# Patient Record
Sex: FEMALE | Race: OTHER | HISPANIC OR LATINO | URBAN - METROPOLITAN AREA
[De-identification: names, ages, dates, MRNs, and addresses within clinical notes are randomized per-mention and may not be internally consistent; named-entity substitution may affect disease eponyms.]

---

## 2020-11-20 ENCOUNTER — EMERGENCY (EMERGENCY)
Facility: HOSPITAL | Age: 25
LOS: 1 days | Discharge: HOME | End: 2020-11-20
Attending: EMERGENCY MEDICINE | Admitting: EMERGENCY MEDICINE
Payer: SELF-PAY

## 2020-11-20 VITALS
TEMPERATURE: 98 F | HEART RATE: 84 BPM | HEIGHT: 63 IN | OXYGEN SATURATION: 99 % | RESPIRATION RATE: 18 BRPM | SYSTOLIC BLOOD PRESSURE: 126 MMHG | DIASTOLIC BLOOD PRESSURE: 75 MMHG | WEIGHT: 164.91 LBS

## 2020-11-20 VITALS
TEMPERATURE: 98 F | DIASTOLIC BLOOD PRESSURE: 72 MMHG | HEART RATE: 78 BPM | RESPIRATION RATE: 16 BRPM | OXYGEN SATURATION: 98 % | SYSTOLIC BLOOD PRESSURE: 120 MMHG

## 2020-11-20 DIAGNOSIS — M54.9 DORSALGIA, UNSPECIFIED: ICD-10-CM

## 2020-11-20 PROCEDURE — 73562 X-RAY EXAM OF KNEE 3: CPT

## 2020-11-20 PROCEDURE — 99283 EMERGENCY DEPT VISIT LOW MDM: CPT

## 2020-11-20 PROCEDURE — 99053 MED SERV 10PM-8AM 24 HR FAC: CPT

## 2020-11-20 PROCEDURE — 81025 URINE PREGNANCY TEST: CPT

## 2020-11-20 RX ORDER — METHOCARBAMOL 500 MG/1
500 TABLET, FILM COATED ORAL ONCE
Refills: 0 | Status: COMPLETED | OUTPATIENT
Start: 2020-11-20 | End: 2020-11-20

## 2020-11-20 RX ORDER — OXYCODONE AND ACETAMINOPHEN 5; 325 MG/1; MG/1
1 TABLET ORAL ONCE
Refills: 0 | Status: DISCONTINUED | OUTPATIENT
Start: 2020-11-20 | End: 2020-11-20

## 2020-11-20 RX ADMIN — OXYCODONE AND ACETAMINOPHEN 1 TABLET(S): 5; 325 TABLET ORAL at 23:53

## 2020-11-20 RX ADMIN — METHOCARBAMOL 500 MILLIGRAM(S): 500 TABLET, FILM COATED ORAL at 23:53
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
## 2020-11-20 NOTE — ED ADULT NURSE NOTE - CHPI ED NUR SYMPTOMS NEG
no acting out behaviors/no bruising/no crying/no decreased eating/drinking/no difficulty bearing weight/no disorientation/no dizziness/no fussiness/no laceration/no loss of consciousness/no sleeping issues

## 2020-11-20 NOTE — ED ADULT NURSE NOTE - NSIMPLEMENTINTERV_GEN_ALL_ED
Implemented All Universal Safety Interventions:  [place] to call system. Call bell, personal items and telephone within reach. Instruct patient to call for assistance. Room bathroom lighting operational. Non-slip footwear when patient is off stretcher. Physically safe environment: no spills, clutter or unnecessary equipment. Stretcher in lowest position, wheels locked, appropriate side rails in place.

## 2020-11-21 PROCEDURE — 73562 X-RAY EXAM OF KNEE 3: CPT | Mod: 26,LT

## 2020-11-21 RX ORDER — OXYCODONE AND ACETAMINOPHEN 5; 325 MG/1; MG/1
1 TABLET ORAL
Qty: 12 | Refills: 0
Start: 2020-11-21 | End: 2020-11-23

## 2020-11-21 RX ORDER — METHOCARBAMOL 500 MG/1
1 TABLET, FILM COATED ORAL
Qty: 15 | Refills: 0
Start: 2020-11-21 | End: 2020-11-25

## 2020-11-21 RX ADMIN — OXYCODONE AND ACETAMINOPHEN 1 TABLET(S): 5; 325 TABLET ORAL at 00:26
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
## 2020-11-21 NOTE — ED PROVIDER NOTE - PATIENT PORTAL LINK FT
You can access the FollowMyHealth Patient Portal offered by [facility] by registering at the following website: [email or web address]. By joining [facility] FollowMyHealth portal, you will also be able to view your health information using other applications (apps) compatible with our system.
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
## 2023-03-21 ENCOUNTER — APPOINTMENT (OUTPATIENT)
Dept: OBGYN | Facility: CLINIC | Age: 28
End: 2023-03-21
Payer: COMMERCIAL

## 2023-03-21 VITALS
SYSTOLIC BLOOD PRESSURE: 133 MMHG | HEIGHT: 62 IN | DIASTOLIC BLOOD PRESSURE: 79 MMHG | BODY MASS INDEX: 36.07 KG/M2 | WEIGHT: 196 LBS

## 2023-03-21 DIAGNOSIS — Z30.430 ENCOUNTER FOR INSERTION OF INTRAUTERINE CONTRACEPTIVE DEVICE: ICD-10-CM

## 2023-03-21 DIAGNOSIS — Z78.9 OTHER SPECIFIED HEALTH STATUS: ICD-10-CM

## 2023-03-21 PROCEDURE — 58300 INSERT INTRAUTERINE DEVICE: CPT

## 2023-03-21 PROCEDURE — 64435 NJX AA&/STRD PARACRV NRV: CPT

## 2023-03-21 PROCEDURE — 76998 US GUIDE INTRAOP: CPT

## 2023-03-21 PROCEDURE — 99203 OFFICE O/P NEW LOW 30 MIN: CPT | Mod: 25

## 2023-03-21 NOTE — PLAN
[FreeTextEntry1] : Patient is a 27 year old, G0, seen for IUD insertion.\par - HCG negative\par - GC/CT obtained\par - 600mg Motrin PO provided to patient\par - IUD inserted under transabdominal ultrasound guidance to fundus\par - fundal placement confirmed via TVUS\par - All questions/concerns addressed to patient's satisfaction\par - Patient to follow up as needed and for desired GYN care\par

## 2023-03-21 NOTE — HISTORY OF PRESENT ILLNESS
[FreeTextEntry1] : Patient is a 27 year old, G0,  presenting for contraceptive consultation.\par \par Patient does not have a primary GYN; was referred by Sarai Coelho (primary care)\par Last pap: 2/4/23 (WNL) done by PCP.\par \par GYNHx:\par Denies abnl pap smears\par Denies fibroids/endometriosis/cysts\par Denies STIs\par \par LMP: 3/13/23, irregular cycles\par Contraception: DMPA - was not happy with method of contraception because of cycle irregularities and headaches and stopped 2 months ago, has not used contraception since except for condoms\par \par SexualHx: 1 week ago, with condoms\par \par 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

## 2023-03-21 NOTE — REASON FOR VISIT
[Consultation] : consultation for [[employer] ] : provided by [employer]   [Time Spent: ____ minutes] : Total time spent using  services: [unfilled] minutes. The patient's primary language is not English thus required  services. [Interpreters_IDNumber] : [id number] [Interpreters_FullName] : Duran [TWNoteComboBox1] : Russian

## 2023-03-21 NOTE — PHYSICAL EXAM
[Appropriately responsive] : appropriately responsive [Alert] : alert [No Acute Distress] : no acute distress [Oriented x3] : oriented x3 [Labia Majora] : normal [Labia Minora] : normal [Normal] : normal [Retroversion] : retroverted

## 2023-03-23 LAB
C TRACH RRNA SPEC QL NAA+PROBE: NOT DETECTED
SOURCE AMPLIFICATION: NORMAL

## 2023-05-02 ENCOUNTER — APPOINTMENT (OUTPATIENT)
Dept: OBGYN | Facility: CLINIC | Age: 28
End: 2023-05-02
Payer: MEDICAID

## 2023-05-02 ENCOUNTER — OUTPATIENT (OUTPATIENT)
Dept: OUTPATIENT SERVICES | Facility: HOSPITAL | Age: 28
LOS: 1 days | End: 2023-05-02
Payer: MEDICAID

## 2023-05-02 VITALS
BODY MASS INDEX: 36.63 KG/M2 | SYSTOLIC BLOOD PRESSURE: 130 MMHG | WEIGHT: 200.25 LBS | DIASTOLIC BLOOD PRESSURE: 80 MMHG

## 2023-05-02 DIAGNOSIS — Z30.09 ENCOUNTER FOR OTHER GENERAL COUNSELING AND ADVICE ON CONTRACEPTION: ICD-10-CM

## 2023-05-02 DIAGNOSIS — N76.0 ACUTE VAGINITIS: ICD-10-CM

## 2023-05-02 PROCEDURE — 99213 OFFICE O/P EST LOW 20 MIN: CPT | Mod: GE

## 2023-05-04 NOTE — PLAN
[FreeTextEntry1] : 29yo G0 with Mirena placed on 3/21 presenting for IUD string check. Patient feels well today, has no issues with the IUD, and IUD string seen on pelvic exam.\par \par #Mirena IUD\par - Strings visualized on exam, IUD in place\par - Counseled patient that abnormal spotting may continue for a few months\par \par #Healthcare maintenance\par - pap NILM (2/2023)\par \par RTC for annual or sooner PRN\par D/w Dr. Colon\par Alee Fagan, PGY-1\par \par \par

## 2023-05-04 NOTE — PHYSICAL EXAM
[Appropriately responsive] : appropriately responsive [Alert] : alert [No Acute Distress] : no acute distress [Soft] : soft [Non-tender] : non-tender [Non-distended] : non-distended [No Lesions] : no lesions [No Mass] : no mass [Oriented x3] : oriented x3 [Labia Majora] : normal [Labia Minora] : normal [Scant] : There was scant vaginal bleeding [Old Blood] : old blood was present in the vagina [IUD String] : an IUD string was noted [Normal] : normal [Uterine Adnexae] : normal [FreeTextEntry4] : well perfused [FreeTextEntry5] : unlabored respirations

## 2023-05-04 NOTE — HISTORY OF PRESENT ILLNESS
[FreeTextEntry1] : 27yo G0 with Mirena placed on 3/21 presenting for IUD string check. Patient feels well today, reports occasional cramping that is relieved with Tylenol and Motrin. Reports some spotting. Denies pelvic pain, fevers/chills, nausea/vomiting, abdominal pain, dysuria, urinary urgency/frequency, changes in bowel habits.

## 2023-05-16 DIAGNOSIS — Z30.09 ENCOUNTER FOR OTHER GENERAL COUNSELING AND ADVICE ON CONTRACEPTION: ICD-10-CM

## 2023-05-18 ENCOUNTER — APPOINTMENT (OUTPATIENT)
Dept: ORTHOPEDIC SURGERY | Facility: CLINIC | Age: 28
End: 2023-05-18
Payer: MEDICAID

## 2023-05-18 DIAGNOSIS — Z00.00 ENCOUNTER FOR GENERAL ADULT MEDICAL EXAMINATION W/OUT ABNORMAL FINDINGS: ICD-10-CM

## 2023-05-18 DIAGNOSIS — M25.561 PAIN IN RIGHT KNEE: ICD-10-CM

## 2023-05-18 PROCEDURE — 73564 X-RAY EXAM KNEE 4 OR MORE: CPT | Mod: LT

## 2023-05-18 PROCEDURE — 99204 OFFICE O/P NEW MOD 45 MIN: CPT

## 2023-05-25 PROBLEM — Z00.00 ENCOUNTER FOR PREVENTIVE HEALTH EXAMINATION: Status: ACTIVE | Noted: 2023-03-13

## 2023-05-25 NOTE — HISTORY OF PRESENT ILLNESS
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

## 2023-05-25 NOTE — DISCUSSION/SUMMARY
[de-identified] : We discussed their diagnosis and treatment options at length. We will first attempt conservative treatment with a course of PT. The patient was provided with a prescription to work on hip ER/abductors strengthening along with quad/hamstring stretches and strengthening on the PF Syndrome Protocol. Ice, NSAIDs. Prescribed diclofenac. \par \par Follow up in 4-6 weeks to re-evaluate. 	\par \par The patient was advised of the diagnosis.  The natural history of the pathology was explained in full to the patient in layman's terms.  Several different treatment options were discussed and explained in full to the patient including the risks and benefits of both surgical and non-surgical treatments.  All questions and concerns were answered.   \par \par Physical Therapy: The patient was provided with a prescription for Physical Therapy.  \par \par Icing: The patient was advised to apply ice (wrapped in a towel or protective covering) to the area daily (20 minutes at a time, 2-4X/day).   \par \par Pain Guide Activities: The patient was advised to let pain guide the gradual advancement of activities.	\par

## 2023-06-22 ENCOUNTER — APPOINTMENT (OUTPATIENT)
Dept: ORTHOPEDIC SURGERY | Facility: CLINIC | Age: 28
End: 2023-06-22

## 2023-07-05 ENCOUNTER — RX RENEWAL (OUTPATIENT)
Age: 28
End: 2023-07-05

## 2023-08-14 ENCOUNTER — RX RENEWAL (OUTPATIENT)
Age: 28
End: 2023-08-14

## 2023-08-24 ENCOUNTER — APPOINTMENT (OUTPATIENT)
Dept: ORTHOPEDIC SURGERY | Facility: CLINIC | Age: 28
End: 2023-08-24
Payer: MEDICAID

## 2023-08-24 DIAGNOSIS — M22.2X2 PATELLOFEMORAL DISORDERS, LEFT KNEE: ICD-10-CM

## 2023-08-24 PROCEDURE — 99213 OFFICE O/P EST LOW 20 MIN: CPT

## 2023-08-24 NOTE — HISTORY OF PRESENT ILLNESS
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 She has completed 10 sessions of PT.

## 2023-08-24 NOTE — IMAGING
[de-identified] : The patient is a well appearing 28 year year old female of their stated age.\par  Patient ambulates with a normal gait.\par  Negative straight leg raise bilateral\par  \par  Effected Knee:                         	\par  ROM:  0-145 degrees\par  \par  Lachman: Negative\par  Pivot Shift: Negative\par  Anterior Drawer: Negative\par  Posterior Drawer / Sag: Negative\par  Varus Stress 0 degrees: Stable\par  Varus Stress 30 degrees: Stable\par  Valgus Stress 0 degrees: Stable\par  Valgus Stress 30 degrees: Stable\par  Medial Yoseph: Negative\par  Lateral Yoseph: Negative\par  Patella Glide: 2+\par  Patella Apprehension: Negative\par  Patella Grind: Negative\par  \par  Palpation:\par  Medial Joint Line: Nontender\par  Lateral Joint Line: Nontender\par  Medial Collateral Ligament: Nontender\par  Lateral Collateral Ligament/PLC: Nontender\par  Medial patellar facet: TTP\par  Distal Femur: Nontender\par  Proximal Tibia: Nontender\par  Tibial Tubercle: Nontender\par  Distal Pole Patella: Nontender\par  Quadriceps Tendon: Nontender &  Intact\par  Patella Tendon: Nontender &  Intact\par  Medial Distal Hamstring/PES: Nontender\par  Lateral Distal Hamstring: Nontender & Stable\par  Iliotibial Band: Nontender\par  Medial Patellofemoral Ligament: Nontender\par  Adductor: Nontender\par  Proximal GSC-Plantaris: Nontender\par  Calf: Supple & Nontender\par  \par  Inspection:\par  Deformity: No\par  Erythema: No\par  Ecchymosis: No\par  Abrasions: No\par  Effusion: No\par  Prepatellar Bursitis: No\par  \par  Neurologic Exam:\par  Sensation L4-S1: Grossly Intact\par  \par  Motor Exam:\par  Hip Adductors: 4+/5\par  Quadriceps: 5 out of 5\par  Hamstrings: 5 out of 5\par  EHL: 5 out of 5\par  FHL: 5 out of 5\par  TA: 5 out of 5\par  GS: 5 out of 5\par  \par  Circulatory/Pulses:\par  Dorsalis Pedis: 2+\par  Posterior Tibialis: 2+\par  \par  Additional Pertinent Findings: None\par  \par  Contralateral Knee:                           	\par  ROM: 0-145 degrees\par  \par  Other Pertinent Findings: None\par

## 2023-08-24 NOTE — ASSESSMENT
[FreeTextEntry1] : Continued L knee pain She has completed > 6 weeks of PT, has failed NSAIDs indicated for MRI L knee to r/o MMT FU for MRI review

## 2023-09-06 PROBLEM — Z00.00 ENCOUNTER FOR PREVENTIVE HEALTH EXAMINATION: Status: ACTIVE | Noted: 2023-09-06

## 2023-09-07 ENCOUNTER — OUTPATIENT (OUTPATIENT)
Dept: OUTPATIENT SERVICES | Facility: HOSPITAL | Age: 28
LOS: 1 days | End: 2023-09-07
Payer: MEDICAID

## 2023-09-07 ENCOUNTER — APPOINTMENT (OUTPATIENT)
Dept: MRI IMAGING | Facility: CLINIC | Age: 28
End: 2023-09-07

## 2023-09-07 ENCOUNTER — RESULT REVIEW (OUTPATIENT)
Age: 28
End: 2023-09-07

## 2023-09-07 DIAGNOSIS — Z00.8 ENCOUNTER FOR OTHER GENERAL EXAMINATION: ICD-10-CM

## 2023-09-07 DIAGNOSIS — M22.2X2 PATELLOFEMORAL DISORDERS, LEFT KNEE: ICD-10-CM

## 2023-09-07 PROCEDURE — 73721 MRI JNT OF LWR EXTRE W/O DYE: CPT | Mod: 26,LT

## 2023-09-07 PROCEDURE — 73721 MRI JNT OF LWR EXTRE W/O DYE: CPT

## 2023-09-14 ENCOUNTER — APPOINTMENT (OUTPATIENT)
Dept: ORTHOPEDIC SURGERY | Facility: CLINIC | Age: 28
End: 2023-09-14
Payer: MEDICAID

## 2023-09-14 VITALS — WEIGHT: 200 LBS | BODY MASS INDEX: 36.8 KG/M2 | HEIGHT: 62 IN

## 2023-09-14 PROCEDURE — 99214 OFFICE O/P EST MOD 30 MIN: CPT

## 2023-09-27 ENCOUNTER — APPOINTMENT (OUTPATIENT)
Dept: ORTHOPEDIC SURGERY | Facility: CLINIC | Age: 28
End: 2023-09-27
Payer: MEDICAID

## 2023-09-27 PROCEDURE — 99204 OFFICE O/P NEW MOD 45 MIN: CPT

## 2023-10-05 ENCOUNTER — OUTPATIENT (OUTPATIENT)
Dept: OUTPATIENT SERVICES | Facility: HOSPITAL | Age: 28
LOS: 1 days | End: 2023-10-05
Payer: MEDICAID

## 2023-10-05 ENCOUNTER — APPOINTMENT (OUTPATIENT)
Dept: MRI IMAGING | Facility: CLINIC | Age: 28
End: 2023-10-05
Payer: MEDICAID

## 2023-10-05 DIAGNOSIS — M25.862 OTHER SPECIFIED JOINT DISORDERS, LEFT KNEE: ICD-10-CM

## 2023-10-05 PROCEDURE — 73723 MRI JOINT LWR EXTR W/O&W/DYE: CPT

## 2023-10-05 PROCEDURE — 73723 MRI JOINT LWR EXTR W/O&W/DYE: CPT | Mod: 26,LT

## 2023-10-19 ENCOUNTER — APPOINTMENT (OUTPATIENT)
Dept: ORTHOPEDIC SURGERY | Facility: CLINIC | Age: 28
End: 2023-10-19
Payer: MEDICAID

## 2023-10-19 VITALS — BODY MASS INDEX: 36.8 KG/M2 | HEIGHT: 62 IN | WEIGHT: 200 LBS

## 2023-10-19 DIAGNOSIS — M25.862 OTHER SPECIFIED JOINT DISORDERS, LEFT KNEE: ICD-10-CM

## 2023-10-19 PROCEDURE — 20206Z: CUSTOM | Mod: LT

## 2023-10-19 PROCEDURE — 99214 OFFICE O/P EST MOD 30 MIN: CPT | Mod: 25

## 2023-10-30 ENCOUNTER — APPOINTMENT (OUTPATIENT)
Dept: ORTHOPEDIC SURGERY | Facility: CLINIC | Age: 28
End: 2023-10-30
Payer: MEDICAID

## 2023-10-30 VITALS — SYSTOLIC BLOOD PRESSURE: 121 MMHG | HEART RATE: 105 BPM | DIASTOLIC BLOOD PRESSURE: 80 MMHG

## 2023-10-30 VITALS — OXYGEN SATURATION: 98 %

## 2023-10-30 PROCEDURE — 99215 OFFICE O/P EST HI 40 MIN: CPT

## 2023-10-31 LAB — BACTERIA WND CULT: NORMAL

## 2023-11-01 ENCOUNTER — OUTPATIENT (OUTPATIENT)
Dept: OUTPATIENT SERVICES | Facility: HOSPITAL | Age: 28
LOS: 1 days | End: 2023-11-01

## 2023-11-01 VITALS
SYSTOLIC BLOOD PRESSURE: 115 MMHG | WEIGHT: 214.95 LBS | RESPIRATION RATE: 16 BRPM | HEIGHT: 62.5 IN | TEMPERATURE: 98 F | DIASTOLIC BLOOD PRESSURE: 79 MMHG | HEART RATE: 73 BPM | OXYGEN SATURATION: 98 %

## 2023-11-01 DIAGNOSIS — C49.9 MALIGNANT NEOPLASM OF CONNECTIVE AND SOFT TISSUE, UNSPECIFIED: ICD-10-CM

## 2023-11-01 DIAGNOSIS — Z97.5 PRESENCE OF (INTRAUTERINE) CONTRACEPTIVE DEVICE: Chronic | ICD-10-CM

## 2023-11-01 DIAGNOSIS — R22.42 LOCALIZED SWELLING, MASS AND LUMP, LEFT LOWER LIMB: ICD-10-CM

## 2023-11-01 LAB
BLD GP AB SCN SERPL QL: NEGATIVE — SIGNIFICANT CHANGE UP
BLD GP AB SCN SERPL QL: NEGATIVE — SIGNIFICANT CHANGE UP
HCG SERPL-ACNC: <1 MIU/ML — SIGNIFICANT CHANGE UP
HCG SERPL-ACNC: <1 MIU/ML — SIGNIFICANT CHANGE UP
HCT VFR BLD CALC: 40.8 % — SIGNIFICANT CHANGE UP (ref 34.5–45)
HCT VFR BLD CALC: 40.8 % — SIGNIFICANT CHANGE UP (ref 34.5–45)
HGB BLD-MCNC: 13.7 G/DL — SIGNIFICANT CHANGE UP (ref 11.5–15.5)
HGB BLD-MCNC: 13.7 G/DL — SIGNIFICANT CHANGE UP (ref 11.5–15.5)
MCHC RBC-ENTMCNC: 28.1 PG — SIGNIFICANT CHANGE UP (ref 27–34)
MCHC RBC-ENTMCNC: 28.1 PG — SIGNIFICANT CHANGE UP (ref 27–34)
MCHC RBC-ENTMCNC: 33.6 GM/DL — SIGNIFICANT CHANGE UP (ref 32–36)
MCHC RBC-ENTMCNC: 33.6 GM/DL — SIGNIFICANT CHANGE UP (ref 32–36)
MCV RBC AUTO: 83.8 FL — SIGNIFICANT CHANGE UP (ref 80–100)
MCV RBC AUTO: 83.8 FL — SIGNIFICANT CHANGE UP (ref 80–100)
NRBC # BLD: 0 /100 WBCS — SIGNIFICANT CHANGE UP (ref 0–0)
NRBC # BLD: 0 /100 WBCS — SIGNIFICANT CHANGE UP (ref 0–0)
NRBC # FLD: 0 K/UL — SIGNIFICANT CHANGE UP (ref 0–0)
NRBC # FLD: 0 K/UL — SIGNIFICANT CHANGE UP (ref 0–0)
PLATELET # BLD AUTO: 339 K/UL — SIGNIFICANT CHANGE UP (ref 150–400)
PLATELET # BLD AUTO: 339 K/UL — SIGNIFICANT CHANGE UP (ref 150–400)
RBC # BLD: 4.87 M/UL — SIGNIFICANT CHANGE UP (ref 3.8–5.2)
RBC # BLD: 4.87 M/UL — SIGNIFICANT CHANGE UP (ref 3.8–5.2)
RBC # FLD: 12.6 % — SIGNIFICANT CHANGE UP (ref 10.3–14.5)
RBC # FLD: 12.6 % — SIGNIFICANT CHANGE UP (ref 10.3–14.5)
RH IG SCN BLD-IMP: POSITIVE — SIGNIFICANT CHANGE UP
RH IG SCN BLD-IMP: POSITIVE — SIGNIFICANT CHANGE UP
WBC # BLD: 6.65 K/UL — SIGNIFICANT CHANGE UP (ref 3.8–10.5)
WBC # BLD: 6.65 K/UL — SIGNIFICANT CHANGE UP (ref 3.8–10.5)
WBC # FLD AUTO: 6.65 K/UL — SIGNIFICANT CHANGE UP (ref 3.8–10.5)
WBC # FLD AUTO: 6.65 K/UL — SIGNIFICANT CHANGE UP (ref 3.8–10.5)

## 2023-11-01 RX ORDER — ACETAMINOPHEN 500 MG
975 TABLET ORAL ONCE
Refills: 0 | Status: COMPLETED | OUTPATIENT
Start: 2023-11-09 | End: 2023-11-09

## 2023-11-01 RX ORDER — SODIUM CHLORIDE 9 MG/ML
1000 INJECTION, SOLUTION INTRAVENOUS
Refills: 0 | Status: DISCONTINUED | OUTPATIENT
Start: 2023-11-09 | End: 2023-11-23

## 2023-11-01 NOTE — H&P PST ADULT - NSICDXPASTMEDICALHX_GEN_ALL_CORE_FT
PAST MEDICAL HISTORY:  Hyperlipidemia     Mass of left knee      PAST MEDICAL HISTORY:  Hyperlipidemia     Mass of left knee     Obese

## 2023-11-01 NOTE — H&P PST ADULT - NSICDXPASTSURGICALHX_GEN_ALL_CORE_FT
PAST SURGICAL HISTORY:  No significant past surgical history PAST SURGICAL HISTORY:  Presence of IUD

## 2023-11-01 NOTE — H&P PST ADULT - PROBLEM SELECTOR PLAN 1
Pt. is scheduled for a radical resection left knee mass, possible quad tendon allograft reconstruction 11/9/223.  Pt. verbalized understanding of instructions and that Chlorhexidine is for external use.

## 2023-11-01 NOTE — H&P PST ADULT - REASON FOR ADMISSION
"my left knee, there's like a mass here, this 1 like almost 3 years, it's gotten bigger and more pain.

## 2023-11-01 NOTE — H&P PST ADULT - ATTENDING COMMENTS
I have reviewed and agree with note as written above  for radical resection of left knee mass and possible quad reconstruction possible allograft  Risks, benefits and alternatives discussed with patient.  Ham Delgado MD  Musculoskeletal Oncology  [phone]

## 2023-11-08 ENCOUNTER — TRANSCRIPTION ENCOUNTER (OUTPATIENT)
Age: 28
End: 2023-11-08

## 2023-11-08 ENCOUNTER — APPOINTMENT (OUTPATIENT)
Dept: NUCLEAR MEDICINE | Facility: CLINIC | Age: 28
End: 2023-11-08
Payer: MEDICAID

## 2023-11-08 ENCOUNTER — OUTPATIENT (OUTPATIENT)
Dept: OUTPATIENT SERVICES | Facility: HOSPITAL | Age: 28
LOS: 1 days | End: 2023-11-08
Payer: MEDICAID

## 2023-11-08 DIAGNOSIS — C49.9 MALIGNANT NEOPLASM OF CONNECTIVE AND SOFT TISSUE, UNSPECIFIED: ICD-10-CM

## 2023-11-08 DIAGNOSIS — Z97.5 PRESENCE OF (INTRAUTERINE) CONTRACEPTIVE DEVICE: Chronic | ICD-10-CM

## 2023-11-08 PROCEDURE — 78816 PET IMAGE W/CT FULL BODY: CPT | Mod: 26,PI

## 2023-11-08 PROCEDURE — A9552: CPT

## 2023-11-08 PROCEDURE — 78816 PET IMAGE W/CT FULL BODY: CPT

## 2023-11-08 NOTE — ASU PATIENT PROFILE, ADULT - FALL HARM RISK - UNIVERSAL INTERVENTIONS
Bed in lowest position, wheels locked, appropriate side rails in place/Call bell, personal items and telephone in reach/Non-slip footwear when patient is out of bed/[place] to call system/Physically safe environment - no spills, clutter or unnecessary equipment/Purposeful Proactive Rounding/Room/bathroom lighting operational, light cord in reach

## 2023-11-09 ENCOUNTER — TRANSCRIPTION ENCOUNTER (OUTPATIENT)
Age: 28
End: 2023-11-09

## 2023-11-09 ENCOUNTER — APPOINTMENT (OUTPATIENT)
Dept: ORTHOPEDIC SURGERY | Facility: HOSPITAL | Age: 28
End: 2023-11-09

## 2023-11-09 ENCOUNTER — OUTPATIENT (OUTPATIENT)
Dept: OUTPATIENT SERVICES | Facility: HOSPITAL | Age: 28
LOS: 1 days | Discharge: HOME | End: 2023-11-09
Payer: MEDICAID

## 2023-11-09 VITALS
SYSTOLIC BLOOD PRESSURE: 122 MMHG | DIASTOLIC BLOOD PRESSURE: 82 MMHG | HEART RATE: 84 BPM | RESPIRATION RATE: 16 BRPM | OXYGEN SATURATION: 99 % | TEMPERATURE: 99 F | WEIGHT: 214.95 LBS | HEIGHT: 62.5 IN

## 2023-11-09 VITALS — HEART RATE: 96 BPM | OXYGEN SATURATION: 98 % | RESPIRATION RATE: 18 BRPM

## 2023-11-09 DIAGNOSIS — C49.9 MALIGNANT NEOPLASM OF CONNECTIVE AND SOFT TISSUE, UNSPECIFIED: ICD-10-CM

## 2023-11-09 DIAGNOSIS — Z97.5 PRESENCE OF (INTRAUTERINE) CONTRACEPTIVE DEVICE: Chronic | ICD-10-CM

## 2023-11-09 PROCEDURE — 88309 TISSUE EXAM BY PATHOLOGIST: CPT | Mod: 26

## 2023-11-09 PROCEDURE — 27364 RESECT THIGH/KNEE TUM 5 CM/>: CPT | Mod: LT

## 2023-11-09 PROCEDURE — 27599 UNLISTED PX FEMUR/KNEE: CPT

## 2023-11-09 RX ORDER — FENTANYL CITRATE 50 UG/ML
25 INJECTION INTRAVENOUS
Refills: 0 | Status: DISCONTINUED | OUTPATIENT
Start: 2023-11-09 | End: 2023-11-09

## 2023-11-09 RX ORDER — OXYCODONE HYDROCHLORIDE 5 MG/1
5 TABLET ORAL ONCE
Refills: 0 | Status: DISCONTINUED | OUTPATIENT
Start: 2023-11-09 | End: 2023-11-09

## 2023-11-09 RX ORDER — OXYCODONE HYDROCHLORIDE 5 MG/1
1 TABLET ORAL
Qty: 12 | Refills: 0
Start: 2023-11-09 | End: 2023-11-11

## 2023-11-09 RX ORDER — ONDANSETRON 8 MG/1
4 TABLET, FILM COATED ORAL ONCE
Refills: 0 | Status: COMPLETED | OUTPATIENT
Start: 2023-11-09 | End: 2023-11-09

## 2023-11-09 RX ADMIN — FENTANYL CITRATE 25 MICROGRAM(S): 50 INJECTION INTRAVENOUS at 14:51

## 2023-11-09 RX ADMIN — ONDANSETRON 4 MILLIGRAM(S): 8 TABLET, FILM COATED ORAL at 14:14

## 2023-11-09 RX ADMIN — OXYCODONE HYDROCHLORIDE 5 MILLIGRAM(S): 5 TABLET ORAL at 14:14

## 2023-11-09 RX ADMIN — Medication 975 MILLIGRAM(S): at 10:24

## 2023-11-09 RX ADMIN — OXYCODONE HYDROCHLORIDE 5 MILLIGRAM(S): 5 TABLET ORAL at 14:45

## 2023-11-09 RX ADMIN — FENTANYL CITRATE 25 MICROGRAM(S): 50 INJECTION INTRAVENOUS at 14:44

## 2023-11-09 NOTE — ASU DISCHARGE PLAN (ADULT/PEDIATRIC) - NS MD DC FALL RISK RISK
For information on Fall & Injury Prevention, visit: [email or web address] OR  [email or web address] OR  https://www.cdc.gov/steadi/patient.html

## 2023-11-09 NOTE — BRIEF OPERATIVE NOTE - OPERATION/FINDINGS
Radical resection left knee mass with achilles tendon allograft reconstruction of extensor mechanism

## 2023-11-09 NOTE — PHYSICAL THERAPY INITIAL EVALUATION ADULT - ADDITIONAL COMMENTS
Pt states she lives in a house with her boyfriend, 3 steps to enter and resides on the first floor. Prior to admission pt states she was fully independent in ADLs and ambulation without device.    Following evaluation, pt was left semireclined in bed in no distress, call bell in reach.

## 2023-11-09 NOTE — PHYSICAL THERAPY INITIAL EVALUATION ADULT - PERTINENT HX OF CURRENT PROBLEM, REHAB EVAL
28 year old female post radical resection left knee mass with achilles tendon allograft reconstruction of extensor mechanism POD#0.

## 2023-11-09 NOTE — PHYSICAL THERAPY INITIAL EVALUATION ADULT - ACTIVE RANGE OF MOTION EXAMINATION, REHAB EVAL
left knee in knee immobilizer in extension, ankle ROM WFL./bilateral upper extremity Active ROM was WFL (within functional limits)/Right LE Active ROM was WFL (within functional limits)

## 2023-11-09 NOTE — ASU DISCHARGE PLAN (ADULT/PEDIATRIC) - CARE PROVIDER_API CALL
Ham Delgado  Orthopaedic Surgery  [address]  [place] [address]  Phone: [phone]  Fax: [phone]  Follow Up Time: 2 weeks

## 2023-11-09 NOTE — PHYSICAL THERAPY INITIAL EVALUATION ADULT - GENERAL OBSERVATIONS, REHAB EVAL
Pt encountered sitting in chair in no distress. Blood pressure = 108/88. Left knee immobilizer donned.

## 2023-11-09 NOTE — ASU DISCHARGE PLAN (ADULT/PEDIATRIC) - ASU DC SPECIAL INSTRUCTIONSFT
Please follow all instructions given to you by your surgeon and his/her office  Follow up  as scheduled  Keep dressing clean, dry, and intact  Keep Knee immobilizer on at all times  Rest, ice, and elevate affected extremity   Weight bearing as tolerated with crutches  You can alternate between taking Ibuprofen/Advil/Motrin and Tylenol/Acetaminophen so you are taking pain medication every 3-4 hours. Take oxycodone in addition to that only if your pain is severe. Please follow all instructions given to you by your surgeon and his/her office  Follow up  as scheduled  Keep dressing clean, dry, and intact  Keep Knee immobilizer on at all times  Rest, ice, and elevate affected extremity   Weight bearing as tolerated with crutches  You can alternate between taking Ibuprofen/Advil/Motrin and Tylenol/Acetaminophen so you are taking pain medication every 3-4 hours. Take oxycodone in addition to that only if your pain is severe.  You were given 1000mg IV Tylenol for pain management.  Please DO NOT take any Tylenol containing products, such as  Vicodin, Percocet, Excedrin, many cold preparations for the next 6 hours (until 430p).  DO NOT EXCEED 3000MG OF TYLENOL OVER 24 HOURS.   You were given Toradol for pain management. Please DO Not take Motrin/Ibuprofen/Advil/Aleve (NSAIDS) for the next 6 hours (Until 7p)

## 2023-11-09 NOTE — ASU DISCHARGE PLAN (ADULT/PEDIATRIC) - NURSING INSTRUCTIONS
DO NOT take any Tylenol (Acetaminophen) or narcotics containing Tylenol until after  4:24 pm_ . You received Tylenol during your operation and it can cause damage to your liver if too much is taken within a 24 hour time period. DO NOT take any Ibuprofen ,Advil or Aleeve until after  _7:00 pm__ . You received Toradol during your operation and it can cause damage if too much is taken within a 24 hour time period.

## 2023-11-09 NOTE — PHYSICAL THERAPY INITIAL EVALUATION ADULT - DID THE PATIENT HAVE SURGERY?
Radical resection left knee mass with achilles tendon allograft reconstruction of extensor mechanism/yes

## 2023-11-15 LAB — CORE LAB BIOPSY: NORMAL

## 2023-11-22 ENCOUNTER — APPOINTMENT (OUTPATIENT)
Dept: ORTHOPEDIC SURGERY | Facility: CLINIC | Age: 28
End: 2023-11-22
Payer: MEDICAID

## 2023-11-22 PROCEDURE — 99024 POSTOP FOLLOW-UP VISIT: CPT

## 2023-11-30 NOTE — ASU PATIENT PROFILE, ADULT - TELEPHONIC ID NUMBER OF THE INTERPRETER
Problem: Urinary Retention  Goal: Effective Urinary Elimination  Outcome: Progressing     Problem: Dysrhythmia  Goal: Normalized Cardiac Rhythm  Outcome: Progressing     Goal Outcome Evaluation:  Pt. Returned from OR at 0115.  Pt. Was resting comfortable with mild snoring.  Rhythm normal sinus with rates in the high 80s and low 90s.  O2 sats were originally 88% on room air 2 L NC applied and o2 improved to 98-97%, o2 reduced to 1 L NC.  Drainage from suprapubic catheter site was pale yellow with red tint.  Out put from MILAGROS drain was red.  Dressing changed site due to moderate drainage.    Pt. Stated through the  that he was having pain but not enough to needs medication for.  He was compliant in cares.  Severe swelling of penis and scrotum with taut skin and reddened areas.  Scant draining from urethra site. 1:1 at bedside for safety.  Good out from catheter site.                           [id number]

## 2023-12-04 ENCOUNTER — APPOINTMENT (OUTPATIENT)
Dept: RADIATION ONCOLOGY | Facility: CLINIC | Age: 28
End: 2023-12-04
Payer: MEDICAID

## 2023-12-04 VITALS
WEIGHT: 218.69 LBS | OXYGEN SATURATION: 99 % | HEART RATE: 91 BPM | RESPIRATION RATE: 17 BRPM | SYSTOLIC BLOOD PRESSURE: 118 MMHG | HEIGHT: 62 IN | DIASTOLIC BLOOD PRESSURE: 81 MMHG | BODY MASS INDEX: 40.25 KG/M2

## 2023-12-04 DIAGNOSIS — Z78.9 OTHER SPECIFIED HEALTH STATUS: ICD-10-CM

## 2023-12-04 PROCEDURE — 99204 OFFICE O/P NEW MOD 45 MIN: CPT

## 2023-12-04 RX ORDER — DICLOFENAC SODIUM 75 MG/1
75 TABLET, DELAYED RELEASE ORAL TWICE DAILY
Qty: 60 | Refills: 0 | Status: DISCONTINUED | COMMUNITY
Start: 2023-05-18 | End: 2023-12-04

## 2023-12-05 ENCOUNTER — OUTPATIENT (OUTPATIENT)
Dept: OUTPATIENT SERVICES | Facility: HOSPITAL | Age: 28
LOS: 1 days | Discharge: HOME | End: 2023-12-05
Payer: MEDICAID

## 2023-12-05 DIAGNOSIS — C49.22 MALIGNANT NEOPLASM OF CONNECTIVE AND SOFT TISSUE OF LEFT LOWER LIMB, INCLUDING HIP: ICD-10-CM

## 2023-12-05 DIAGNOSIS — Z97.5 PRESENCE OF (INTRAUTERINE) CONTRACEPTIVE DEVICE: Chronic | ICD-10-CM

## 2023-12-07 ENCOUNTER — APPOINTMENT (OUTPATIENT)
Dept: ORTHOPEDIC SURGERY | Facility: CLINIC | Age: 28
End: 2023-12-07
Payer: MEDICAID

## 2023-12-07 ENCOUNTER — NON-APPOINTMENT (OUTPATIENT)
Age: 28
End: 2023-12-07

## 2023-12-07 PROCEDURE — 99024 POSTOP FOLLOW-UP VISIT: CPT

## 2023-12-07 PROCEDURE — 77263 THER RADIOLOGY TX PLNG CPLX: CPT

## 2023-12-07 PROCEDURE — 77334 RADIATION TREATMENT AID(S): CPT | Mod: 26

## 2023-12-18 NOTE — PHYSICAL EXAM
[de-identified] : No palpable adenopahty [de-identified] : Left anteror surgical scar healed and dry. No swelling or tenderness elicited

## 2023-12-18 NOTE — REVIEW OF SYSTEMS
[Chest Pain] : no chest pain [Palpitations] : no palpitations [Leg Claudication] : no intermittent leg claudication [Joint Pain] : no joint pain [Muscle Pain] : no muscle pain [Skin Rash] : no skin rash [de-identified] : Surgical left knee

## 2023-12-18 NOTE — HISTORY OF PRESENT ILLNESS
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 The mass is intimately associated with the distal quadriceps tendon with irregularity of the superficial surface of the rectus femoris tendon. Mild insertion distally towards the superior aspect of the patella along the prepatellar bursa.Left knee biopsy 10/19/23 revealed malignant neoplasm with melanocytic differentiation; clear cell is favored.  PET/CT scan  11/8/23 1. Intense FDG uptake is noted in known left anterior knee mass.  2.  Tiny less than 5 mm left-sided popliteal focus is of uncertain clinical significance. A popliteal node cannot be excluded. Correlation with recent MRI from 10/5/2023 is recommended.  3.  Nonspecific small left hepatic lobe segment 3 focus with no clear CT correlate is noted. Correlation with a CT or MRI of the liver is recommended as clinically indicated.  4. Multiple bilateral small subcentimeter lung nodules are noted most of which represent calcified granulomas as well as FDG avid mediastinal and hilar lymph nodes which may also represent granulomatous disease, although metastasis cannot be excluded and correlation with a chest CT is recommended.  11/10/2023 S/p radical resection of the left knee sarcoma including resection of periosteum of the patella as well as superficial portion of the quad tendon.   Pathology  1-Left anterior knee mass  Final Diagnosis 1. Left anterior knee mass, resection: - Clear cell sarcoma (see note). - Tumor size is 5.1 x 5.0 x 1.8 cm.  - Tumor involves adipose tissue and the fascia. - Mitotic rate is 3/10 HPFs. - Necrosis is present (<5%). - Deep margin is positive for tumor.    Synoptic Summary 1: Soft Tissue - Resection Clinical Preresection Treatment:   No known preresection therapy Specimen Procedure:  Intralesional resection Tumor Tumor Focality:   Unifocal Tumor Site:  Trunk and extremities - left anterior knee Tumor Size:  5.1 x 5.0 x 1.8 Centimeters (cm) Histologic Type (WHO):   Clear cell sarcoma of soft tissue Histologic Grade (FNCLCC):   Ungraded sarcoma Mitotic Rate:   3 mitoses per 10 high-power fields (HPF) Necrosis (macroscopic or microscopic):    Present Extent of Necrosis:   Cannot be determined - <5% Treatment Effect:   No known presurgical therapy Lymphovascular Invasion:   Not identified Margins Margin Status:   Tumor present at margin Margin(s) Involved by Tumor:   deep Regional Lymph Nodes Regional Lymph Node Status:   Not applicable (no regional lymph nodes submitted or found) Pathologic Stage Classification (pTNM, AJCC 8th Edition) Pathologic Stage Classification:    Histologic type appropriate for staging pT Category:  pT2 pN Category:   pN not assigned (no nodes submitted or found)   She is doing well since surgery, has mild pain aggravated by motion, does not require pain medications. Pain is 2-3 out of 10.  She is able to ambulate independently.

## 2023-12-20 ENCOUNTER — EMERGENCY (EMERGENCY)
Facility: HOSPITAL | Age: 28
LOS: 1 days | Discharge: HOME | End: 2023-12-20
Attending: EMERGENCY MEDICINE | Admitting: EMERGENCY MEDICINE
Payer: MEDICAID

## 2023-12-20 VITALS
HEIGHT: 62.5 IN | WEIGHT: 222.67 LBS | SYSTOLIC BLOOD PRESSURE: 120 MMHG | DIASTOLIC BLOOD PRESSURE: 73 MMHG | HEART RATE: 110 BPM | TEMPERATURE: 98 F | RESPIRATION RATE: 22 BRPM | OXYGEN SATURATION: 99 %

## 2023-12-20 DIAGNOSIS — Z97.5 PRESENCE OF (INTRAUTERINE) CONTRACEPTIVE DEVICE: Chronic | ICD-10-CM

## 2023-12-20 PROCEDURE — 99284 EMERGENCY DEPT VISIT MOD MDM: CPT

## 2023-12-21 ENCOUNTER — APPOINTMENT (OUTPATIENT)
Dept: ORTHOPEDIC SURGERY | Facility: CLINIC | Age: 28
End: 2023-12-21

## 2023-12-21 ENCOUNTER — APPOINTMENT (OUTPATIENT)
Dept: ORTHOPEDIC SURGERY | Facility: CLINIC | Age: 28
End: 2023-12-21
Payer: MEDICAID

## 2023-12-21 PROCEDURE — 73564 X-RAY EXAM KNEE 4 OR MORE: CPT | Mod: 26,LT

## 2023-12-21 PROCEDURE — 73564 X-RAY EXAM KNEE 4 OR MORE: CPT

## 2023-12-21 PROCEDURE — 99024 POSTOP FOLLOW-UP VISIT: CPT

## 2023-12-21 PROCEDURE — 99283 EMERGENCY DEPT VISIT LOW MDM: CPT

## 2023-12-21 NOTE — ED PROVIDER NOTE - NSFOLLOWUPINSTRUCTIONS_ED_ALL_ED_FT
Desgarro del tendón del cuádriceps  Quadriceps Tendon Tear  Side view of the leg muscles showing the quadriceps tendon.  El desgarro o la rotura del tendón del cuádriceps es la ruptura parcial o completa del tendón entre los músculos del cuádriceps y la rótula (patela). Los tendones unen los músculos al hueso. Los músculos cuádriceps están ubicados en la josé miguel anterior del muslo e intervienen en la extensión de la rodilla.    Con un desgarro parcial, el tendón se estira demasiado y algunas de las fibras se rasgan. Con un desgarro completo, los músculos cuádriceps se desprenden de la rótula. Evant ocurre en muy contadas ocasiones.    ¿Cuáles son las causas?  Esta afección puede ser causada por pradip lesión, aylin:  Un ronal profundo en el muslo que lesiona el tendón.  Caerse sobre la rodilla, lo cual puede producir la rotura de la rótula.  Caerse con la rodilla doblada, aylin tropezar cuando está bajando escaleras.  Esta afección también puede ocurrir al saltar y caer de lleno sobre el pie y la rodilla flexionada, lo que produce pradip tensión (contracción) repentina y con fuerza del cuádriceps.    ¿Qué incrementa el riesgo?  Los siguientes factores pueden hacer que sea más propenso a desarrollar esta afección:  Participar en:  Actividades que implican saltar aylin el básquetbol.  Actividades en las que los músculos de la rodilla se contraen repentinamente y con fuerza, aylin saltar o zigzaguear al esquiar pendiente abajo.  Tener un tendón debilitado debido a:  Tendinitis del cuádriceps a cody plazo (crónica).  Largos períodos sin  la rodilla (inmovilización).  Inyecciones de corticoesteroides repetidas en el tendón del cuádriceps.  Afecciones médicas aylin diabetes, lupus o artritis reumatoide.  Degeneración por el paso del tiempo. La mayoría de los desgarros de tendones de los cuádriceps se producen en hombres de más de 40 años de edad.  ¿Cuáles son los signos o síntomas?  Los síntomas de esta afección incluyen:  Sentir, en el momento de la lesión, un abi similar a un chasquido o pradip rotura arriba de la rótula.  Dolor y dolor a la palpación en el muslo. El dolor empeora con el uso de los músculos cuádriceps.  Moretones.  Dificultad para caminar o sensación de que la rodilla se puede trabar.  Pradip rótula hundida o pradip hendidura arriba de la rótula.  Imposibilidad de estirar la rodilla.  ¿Cómo se diagnostica?  Esta afección se puede diagnosticar en función de lo siguiente:  Los síntomas y los antecedentes médicos.  Un examen físico. Ame el examen, el médico hará lo siguiente:  Palpará la josé miguel arriba de la rótula.  Evaluará el movimiento y la fortaleza de la rodilla.  Pruebas de diagnóstico por imágenes para confirmar el diagnóstico y descartar otras afecciones. Estas pueden incluir:  Radiografías para determinar si hay pradip lesión ósea, aylin pradip fractura.  Ecografía o resonancia magnética (RM) para examinar los músculos y tendones que rodean la rodilla.  ¿Cómo se trata?  El tratamiento de esta afección puede incluir:  Medicamentos que ayuden a reducir el dolor y la inflamación.  Terapia de RHCE. Radha incluye reposo, aplicación de hielo y presión (compresión), y levantamiento (elevación) de la josé miguel lesionada.  El uso de un dispositivo ortopédico para la rodilla (inmovilizador) para mantenerla derecha mientras el tendón se recupera. Por lo general, el dispositivo se usará ame unas 6 semanas.  Muletas para alivianar el peso de la pierna lesionada.  Ejercicios de fisioterapia para mejorar el movimiento y la fuerza en la pierna.  Si la lesión implica la separación completa del tendón, con frecuencia se requiere pradip cirugía.    Siga estas instrucciones en weir casa:  Terapia de RHCE    Bag of ice on a towel on the skin.  Mantenga la pierna lesionada en reposo.  Si se lo indican, aplique hielo sobre la josé miguel de la lesión. Para hacer esto:  Si usa pradip rodillera desmontable, quítesela según las indicaciones del médico.  Ponga el hielo en pradip bolsa plástica.  Coloque pradip toalla entre la piel y la bolsa.  Aplique el hielo ame 20 minutos, 2 o 3 veces por día.  Retire el hielo si la piel se pone de color amaro brillante. Evant es muy importante. Si no puede sentir dolor, calor o frío, tiene un mayor riesgo de que se dañe la josé miguel.  Aplique un vendaje de compresión en la josé miguel afectada aylin se lo haya indicado el médico.  Cuando esté sentado o acostado, eleve la josé miguel de la lesión por encima del nivel del corazón.  Si tiene un dispositivo ortopédico extraíble:    Úselo aylin se lo haya indicado el médico. Quíteselo solamente aylin se lo haya indicado el médico.  Controle todos los jarret la piel alrededor del dispositivo ortopédico. Informe al médico acerca de cualquier inquietud.  Aflójelo si los dedos de los pies se le adormecen, siente hormigueos o se le enfrían y se tornan de color bobby.  Mantenga limpio el dispositivo ortopédico.  Si el dispositivo ortopédico no es impermeable:  No deje que se moje.  Cúbralo con un envoltorio hermético cuando tome un baño de inmersión o pradip ducha.  Pregúntele al médico cuándo puede volver a conducir si tiene pradip rodillera.  Actividad    No apoye el peso del cuerpo sobre la extremidad lesionada hasta que lo autorice el médico. Use las muletas aylin se lo haya indicado el médico.  Laurie ejercicio aylin se lo haya indicado el médico.  Retome kelsea actividades normales aylin se lo haya indicado el médico. Pregúntele al médico qué actividades son seguras para usted.  Instrucciones generales    Use los medicamentos de venta jaelyn y los recetados solamente aylin se lo haya indicado el médico.  No consuma ningún producto que contenga nicotina o tabaco. Estos productos incluyen cigarrillos, tabaco para mascar y aparatos de vapeo, aylin los cigarrillos electrónicos. Estos pueden retrasar la recuperación. Si necesita ayuda para dejar de fumar, consulte al médico.  Concurra a todas las visitas de seguimiento. [place] es importante.  ¿Cómo se previene?  Precaliente y elongue adecuadamente antes de hacer actividad física.  Relájese y elongue después de hacer actividad física.  Delgado al cuerpo tiempo para descansar entre los períodos de actividad física.  Mantenga un buen estado físico, que incluye lo siguiente:  Fuerza.  Flexibilidad.  [place] medidas de seguridad y sea responsable al hacer actividad física. Evant ayudará a evitar caídas.  Comuníquese con un médico si:  El dolor y la hinchazón continúan o empeoran, incluso con el tratamiento y el reposo.  No puede caminar o estar de pie sin sentir aylin si la rodilla se traba.  Solicite ayuda de inmediato si:  No puede estirar la rodilla desde pradip posición flexionada.  Resumen  El desgarro o la rotura del tendón del cuádriceps es el desgarro parcial o completo del tendón entre los músculos del cuádriceps y la rótula.  La causa de esta afección es pradip lesión en la josé miguel.  Esta afección se trata con terapia RHCE, fisioterapia y medicamentos. A menudo es necesaria la cirugía si el tendón está completamente desgarrado.  No apoye el peso del cuerpo sobre la extremidad lesionada hasta que lo autorice el médico. Use las muletas aylin se lo haya indicado el médico.  Retome kelsea actividades normales aylin se lo haya indicado el médico. Pregúntele al médico qué actividades son seguras para usted.  Esta información no tiene aylin fin reemplazar el consejo del médico. Asegúrese de hacerle al médico cualquier pregunta que tenga. Desgarro del tendón del cuádriceps  Quadriceps Tendon Tear  Side view of the leg muscles showing the quadriceps tendon.  El desgarro o la rotura del tendón del cuádriceps es la ruptura parcial o completa del tendón entre los músculos del cuádriceps y la rótula (patela). Los tendones unen los músculos al hueso. Los músculos cuádriceps están ubicados en la josé miguel anterior del muslo e intervienen en la extensión de la rodilla.    Con un desgarro parcial, el tendón se estira demasiado y algunas de las fibras se rasgan. Con un desgarro completo, los músculos cuádriceps se desprenden de la rótula. [place] ocurre en muy contadas ocasiones.    ¿Cuáles son las causas?  Esta afección puede ser causada por pradip lesión, aylin:  Un ronal profundo en el muslo que lesiona el tendón.  Caerse sobre la rodilla, lo cual puede producir la rotura de la rótula.  Caerse con la rodilla doblada, aylin tropezar cuando está bajando escaleras.  Esta afección también puede ocurrir al saltar y caer de lleno sobre el pie y la rodilla flexionada, lo que produce pradip tensión (contracción) repentina y con fuerza del cuádriceps.    ¿Qué incrementa el riesgo?  Los siguientes factores pueden hacer que sea más propenso a desarrollar esta afección:  Participar en:  Actividades que implican saltar aylin el básquetbol.  Actividades en las que los músculos de la rodilla se contraen repentinamente y con fuerza, aylin saltar o zigzaguear al esquiar pendiente abajo.  Tener un tendón debilitado debido a:  Tendinitis del cuádriceps a cody plazo (crónica).  Largos períodos sin  la rodilla (inmovilización).  Inyecciones de corticoesteroides repetidas en el tendón del cuádriceps.  Afecciones médicas aylin diabetes, lupus o artritis reumatoide.  Degeneración por el paso del tiempo. La mayoría de los desgarros de tendones de los cuádriceps se producen en hombres de más de 40 años de edad.  ¿Cuáles son los signos o síntomas?  Los síntomas de esta afección incluyen:  Sentir, en el momento de la lesión, un abi similar a un chasquido o pradip rotura arriba de la rótula.  Dolor y dolor a la palpación en el muslo. El dolor empeora con el uso de los músculos cuádriceps.  Moretones.  Dificultad para caminar o sensación de que la rodilla se puede trabar.  Pradip rótula hundida o pradip hendidura arriba de la rótula.  Imposibilidad de estirar la rodilla.  ¿Cómo se diagnostica?  Esta afección se puede diagnosticar en función de lo siguiente:  Los síntomas y los antecedentes médicos.  Un examen físico. Ame el examen, el médico hará lo siguiente:  Palpará la josé miguel arriba de la rótula.  Evaluará el movimiento y la fortaleza de la rodilla.  Pruebas de diagnóstico por imágenes para confirmar el diagnóstico y descartar otras afecciones. Estas pueden incluir:  Radiografías para determinar si hay pradip lesión ósea, alyin pradip fractura.  Ecografía o resonancia magnética (RM) para examinar los músculos y tendones que rodean la rodilla.  ¿Cómo se trata?  El tratamiento de esta afección puede incluir:  Medicamentos que ayuden a reducir el dolor y la inflamación.  Terapia de RHCE. Radha incluye reposo, aplicación de hielo y presión (compresión), y levantamiento (elevación) de la josé miguel lesionada.  El uso de un dispositivo ortopédico para la rodilla (inmovilizador) para mantenerla derecha mientras el tendón se recupera. Por lo general, el dispositivo se usará ame unas 6 semanas.  Muletas para alivianar el peso de la pierna lesionada.  Ejercicios de fisioterapia para mejorar el movimiento y la fuerza en la pierna.  Si la lesión implica la separación completa del tendón, con frecuencia se requiere pradip cirugía.    Siga estas instrucciones en weir casa:  Terapia de RHCE    Bag of ice on a towel on the skin.  Mantenga la pierna lesionada en reposo.  Si se lo indican, aplique hielo sobre la josé miguel de la lesión. Para hacer esto:  Si usa pradip rodillera desmontable, quítesela según las indicaciones del médico.  Ponga el hielo en pradip bolsa plástica.  Coloque pradip toalla entre la piel y la bolsa.  Aplique el hielo ame 20 minutos, 2 o 3 veces por día.  Retire el hielo si la piel se pone de color amaro brillante. [place] es muy importante. Si no puede sentir dolor, calor o frío, tiene un mayor riesgo de que se dañe la josé miguel.  Aplique un vendaje de compresión en la josé miguel afectada aylin se lo haya indicado el médico.  Cuando esté sentado o acostado, eleve la josé miguel de la lesión por encima del nivel del corazón.  Si tiene un dispositivo ortopédico extraíble:    Úselo aylin se lo haya indicado el médico. Quíteselo solamente aylin se lo haya indicado el médico.  Controle todos los jarret la piel alrededor del dispositivo ortopédico. Informe al médico acerca de cualquier inquietud.  Aflójelo si los dedos de los pies se le adormecen, siente hormigueos o se le enfrían y se tornan de color bobby.  Mantenga limpio el dispositivo ortopédico.  Si el dispositivo ortopédico no es impermeable:  No deje que se moje.  Cúbralo con un envoltorio hermético cuando tome un baño de inmersión o pradip ducha.  Pregúntele al médico cuándo puede volver a conducir si tiene pradip rodillera.  Actividad    No apoye el peso del cuerpo sobre la extremidad lesionada hasta que lo autorice el médico. Use las muletas aylin se lo haya indicado el médico.  Laurie ejercicio aylin se lo haya indicado el médico.  Retome kelsea actividades normales aylin se lo haya indicado el médico. Pregúntele al médico qué actividades son seguras para usted.  Instrucciones generales    Use los medicamentos de venta jaelyn y los recetados solamente aylin se lo haya indicado el médico.  No consuma ningún producto que contenga nicotina o tabaco. Estos productos incluyen cigarrillos, tabaco para mascar y aparatos de vapeo, aylin los cigarrillos electrónicos. Estos pueden retrasar la recuperación. Si necesita ayuda para dejar de fumar, consulte al médico.  Concurra a todas las visitas de seguimiento. [place] es importante.  ¿Cómo se previene?  Precaliente y elongue adecuadamente antes de hacer actividad física.  Relájese y elongue después de hacer actividad física.  Delgado al cuerpo tiempo para descansar entre los períodos de actividad física.  Mantenga un buen estado físico, que incluye lo siguiente:  Fuerza.  Flexibilidad.  [place] medidas de seguridad y sea responsable al hacer actividad física. [place] ayudará a evitar caídas.  Comuníquese con un médico si:  El dolor y la hinchazón continúan o empeoran, incluso con el tratamiento y el reposo.  No puede caminar o estar de pie sin sentir aylin si la rodilla se traba.  Solicite ayuda de inmediato si:  No puede estirar la rodilla desde pradip posición flexionada.  Resumen  El desgarro o la rotura del tendón del cuádriceps es el desgarro parcial o completo del tendón entre los músculos del cuádriceps y la rótula.  La causa de esta afección es pradip lesión en la josé miguel.  Esta afección se trata con terapia RHCE, fisioterapia y medicamentos. A menudo es necesaria la cirugía si el tendón está completamente desgarrado.  No apoye el peso del cuerpo sobre la extremidad lesionada hasta que lo autorice el médico. Use las muletas aylin se lo haya indicado el médico.  Retome kelsea actividades normales aylin se lo haya indicado el médico. Pregúntele al médico qué actividades son seguras para usted.  Esta información no tiene aylin fin reemplazar el consejo del médico. Asegúrese de hacerle al médico cualquier pregunta que tenga.

## 2023-12-21 NOTE — HISTORY OF PRESENT ILLNESS
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  She went to the hospital and was found to have a small fracture. [de-identified] : Incisions clean and dry.  She has significant pain in the knee as well as some swelling.  I can range her to 80 degrees but she currently does not have any active extension.  She is trying but it is too painful.  She does seem to have continuity of the quad tendon with no palpable defect.  It is swollen in general. [de-identified] : X-rays from last night show a small ossific density consistent with a small avulsion type fracture off of the proximal pole of the patella.  The majority of the patella is intact. I did a focused ultrasound of the area and was not able to find any area with a defect.  The quad tendon appears to be intact although there is a lot of swelling in the area. [de-identified] : Patient is set to do radiation although she does have this complication.  I would hold off on radiation for now.  I like to see her again in 2 weeks and see how she is feeling.  If she is actively able to hold extension or actively able to extend it she was able to do prior to this I would leave her alone however if she is still weak and I would consider doing a revision surgery.  That small fragment is a small pull off from where some of the quad was attached however I still think the majority of the quad is intact.  She had her own vastus intermedius that we just reinforced, so hopefully this is just pain and she heals.  If she does not get better then I would do an MRI scan to look at the quad.  Follow-up again in2 weeks [de-identified] :   If imaging or pathology/biopsy was ordered, the patient was told to make an appointment to review findings right after all imaging is completed.  We discussed risks, benefits and alternatives. Rationale of care was reviewed and all questions were answered. Patient (and family) had all questions answered to her degree of the level of satisfaction. Patient (and family) expressed understanding and interest in proceeding with the plan as outlined.     This note was done with a voice recognition transcription software and any typos are related to this rather than medical error. Surgical risks reviewed. Patient (and family) had all questions answered to an agreeable level of satisfaction. Patient (and family) expressed understanding and interest in proceeding with the plan as outlined.

## 2023-12-21 NOTE — ED PROVIDER NOTE - MUSCULOSKELETAL, MLM
left LE with knee brace in place (can adjust for motion), pt unable to straight raise at knee, NVI distally, +ttp over patellar tendon, mild swelling anterior knee, 2+ distal pulses, skin intact

## 2023-12-21 NOTE — ED ADULT NURSE NOTE - NSFALLHARMRISKINTERV_ED_ALL_ED
Communicate risk of Fall with Harm to all staff, patient, and family/Provide visual cue: red socks, yellow wristband, yellow gown, etc/Reinforce activity limits and safety measures with patient and family/Bed in lowest position, wheels locked, appropriate side rails in place/Call bell, personal items and telephone in reach/Instruct patient to call for assistance before getting out of bed/chair/stretcher/Non-slip footwear applied when patient is off stretcher/[place] to call system/Physically safe environment - no spills, clutter or unnecessary equipment/Purposeful Proactive Rounding/Room/bathroom lighting operational, light cord in reach Communicate risk of Fall with Harm to all staff, patient, and family/Provide visual cue: red socks, yellow wristband, yellow gown, etc/Reinforce activity limits and safety measures with patient and family/Bed in lowest position, wheels locked, appropriate side rails in place/Call bell, personal items and telephone in reach/Instruct patient to call for assistance before getting out of bed/chair/stretcher/Non-slip footwear applied when patient is off stretcher/[place] to call system/Physically safe environment - no spills, clutter or unnecessary equipment/Purposeful Proactive Rounding/Room/bathroom lighting operational, light cord in reach

## 2023-12-21 NOTE — ED PROVIDER NOTE - PATIENT PORTAL LINK FT
You can access the FollowMyHealth Patient Portal offered by [facility] by registering at the following website: [email or web address]. By joining [facility]’s FollowMyHealth portal, you will also be able to view your health information using other applications (apps) compatible with our system. You can access the FollowMyHealth Patient Portal offered by [facility] by registering at the following website: [email or web address]. By joining [facility]’s FollowMyHealth portal, you will also be able to view your health information using other applications (apps) compatible with our system.

## 2023-12-21 NOTE — ED ADULT NURSE NOTE - INTERPRETATION SERVICES DECLINED
Discontinue Regimen: Lavendar products/diffusion of lavendar oil.  Chemical based sunscreens, witch hazel, vitamin C.  Alter to SLS free products and fragrance free. Otc Regimen: Gentle cleansers, moisturizer and a Mineral based sunscreen daily. Vanicream skin care products. Detail Level: Detailed Continue Regimen: Triamcinolone ointment as directed Patient/Caregiver requests family/friend to interpret.

## 2023-12-21 NOTE — ED PROVIDER NOTE - CLINICAL SUMMARY MEDICAL DECISION MAKING FREE TEXT BOX
pt with h/o tumor resection and quads surgery last month now s/p fall and can't straighten leg, need xr, d/w patient may have tendon injury, will need to use crutches and continue with brace and call her surgeon in the morning

## 2023-12-21 NOTE — ED PROVIDER NOTE - PROVIDER TOKENS
PROVIDER:[TOKEN:[[id number]],FOLLOWUP:[1-3 Days]] PROVIDER:[TOKEN:[[id number]],FOLLOWUP:[1-3 Days]]

## 2023-12-21 NOTE — ED PROVIDER NOTE - TEMPLATE, MLM
VSS. Tmax 99.2. Given oxy 5 mg po x 1 but did not have any right great toe/foot pain relief. Paged hospitalist for a one-time order for an extra 0.25 mg po of oxy. Pt stated he had good pain relief after that. No nausea. States appetite is good. Wife was here around dinner time to visit. On 4 LPM via oximizer with sats around 91%. On bed alarm due to severe dyspnea with activity and weakness.    Orthopedic

## 2023-12-21 NOTE — ED PROVIDER NOTE - OBJECTIVE STATEMENT
28 y.o. 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

## 2023-12-28 PROCEDURE — 77338 DESIGN MLC DEVICE FOR IMRT: CPT | Mod: 26

## 2023-12-28 PROCEDURE — 77301 RADIOTHERAPY DOSE PLAN IMRT: CPT | Mod: 26

## 2023-12-28 PROCEDURE — 77300 RADIATION THERAPY DOSE PLAN: CPT | Mod: 26

## 2024-01-04 ENCOUNTER — APPOINTMENT (OUTPATIENT)
Dept: ORTHOPEDIC SURGERY | Facility: CLINIC | Age: 29
End: 2024-01-04
Payer: MEDICAID

## 2024-01-04 PROCEDURE — 99024 POSTOP FOLLOW-UP VISIT: CPT

## 2024-01-04 PROCEDURE — 73560 X-RAY EXAM OF KNEE 1 OR 2: CPT | Mod: LT

## 2024-01-08 PROCEDURE — 77387B: CUSTOM | Mod: 26

## 2024-01-09 ENCOUNTER — NON-APPOINTMENT (OUTPATIENT)
Age: 29
End: 2024-01-09

## 2024-01-09 PROCEDURE — 77387B: CUSTOM | Mod: 26

## 2024-01-10 PROCEDURE — 77387B: CUSTOM | Mod: 26

## 2024-01-10 NOTE — VITALS
[Maximal Pain Intensity: 5/10] : 5/10 [Least Pain Intensity: 0/10] : 0/10 [80: Normal activity with effort; some signs or symptoms of disease.] : 80: Normal activity with effort; some signs or symptoms of disease.  [ECOG Performance Status: 2 - Ambulatory and capable of all self care but unable to carry out any work activities] : Performance Status: 2 - Ambulatory and capable of all self care but unable to carry out any work activities. Up and about more than 50% of waking hours

## 2024-01-10 NOTE — REASON FOR VISIT
[Routine On-Treatment] : a routine on-treatment visit for [Other: ___] : [unfilled] [[employer] ] : provided by [employer]   [Interpreters_IDNumber] : [id number] [Interpreters_FullName] : Kathy [TWNoteComboBox1] : [place]

## 2024-01-10 NOTE — DISEASE MANAGEMENT
[Clinical] : TNM Stage: c [II] : II [TTNM] : 2 [NTNM] : 0 [MTNM] : 0 [de-identified] : 691 [de-identified] : [id number] [de-identified] : Left knee

## 2024-01-10 NOTE — HISTORY OF PRESENT ILLNESS
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 The mass is intimately associated with the distal quadriceps tendon with irregularity of the superficial surface of the rectus femoris tendon. Mild insertion distally towards the superior aspect of the patella along the prepatellar bursa.Left knee biopsy 10/19/23 revealed malignant neoplasm with melanocytic differentiation; clear cell is favored.  PET/CT scan  11/8/23 1. Intense FDG uptake is noted in known left anterior knee mass.  2.  Tiny less than 5 mm left-sided popliteal focus is of uncertain clinical significance. A popliteal node cannot be excluded. Correlation with recent MRI from 10/5/2023 is recommended.  3.  Nonspecific small left hepatic lobe segment 3 focus with no clear CT correlate is noted. Correlation with a CT or MRI of the liver is recommended as clinically indicated.  4. Multiple bilateral small subcentimeter lung nodules are noted most of which represent calcified granulomas as well as FDG avid mediastinal and hilar lymph nodes which may also represent granulomatous disease, although metastasis cannot be excluded and correlation with a chest CT is recommended.  11/10/2023 S/p radical resection of the left knee sarcoma including resection of periosteum of the patella as well as superficial portion of the quad tendon.   Pathology  1-Left anterior knee mass  Final Diagnosis 1. Left anterior knee mass, resection: - Clear cell sarcoma (see note). - Tumor size is 5.1 x 5.0 x 1.8 cm.  - Tumor involves adipose tissue and the fascia. - Mitotic rate is 3/10 HPFs. - Necrosis is present (<5%). - Deep margin is positive for tumor.  Synoptic Summary 1: Soft Tissue - Resection Clinical Preresection Treatment:   No known preresection therapy Specimen Procedure:  Intralesional resection Tumor Tumor Focality:   Unifocal Tumor Site:  Trunk and extremities - left anterior knee Tumor Size:  5.1 x 5.0 x 1.8 Centimeters (cm) Histologic Type (WHO):   Clear cell sarcoma of soft tissue Histologic Grade (FNCLCC):   Ungraded sarcoma Mitotic Rate:   3 mitoses per 10 high-power fields (HPF) Necrosis (macroscopic or microscopic):    Present Extent of Necrosis:   Cannot be determined - <5% Treatment Effect:   No known presurgical therapy Lymphovascular Invasion:   Not identified Margins Margin Status:   Tumor present at margin Margin(s) Involved by Tumor:   deep Regional Lymph Nodes Regional Lymph Node Status:   Not applicable (no regional lymph nodes submitted or found) Pathologic Stage Classification (pTNM, AJCC 8th Edition) Pathologic Stage Classification:    Histologic type appropriate for staging pT Category:  pT2 pN Category:   pN not assigned (no nodes submitted or found)  1/10/2024 Presents today for OTV. Completed fx 3/23 to left leg. States fell and chipped something in her knee. Has to keep leg straight. Wearing brace. Instructed on moisturizing skin.

## 2024-01-11 PROCEDURE — 77387B: CUSTOM | Mod: 26

## 2024-01-12 PROCEDURE — 77427 RADIATION TX MANAGEMENT X5: CPT

## 2024-01-12 PROCEDURE — 77014: CPT | Mod: 26

## 2024-01-15 NOTE — HISTORY OF PRESENT ILLNESS
[___ Months Post Op] : [unfilled] months post op [1] : the patient reports pain that is 1/10 in severity [Clean/Dry/Intact] : clean, dry and intact [Slow Progress] : is progressing slowly [No Sign of Infection] : is showing no signs of infection [Adequate Pain Control] : has adequate pain control [de-identified] : 11/9/2023 -radical resection of left knee sarcoma with quadriceps reinforcement [de-identified] : .Patient is doing better after her most recent injury.  She is starting to move it better.  She has less pain [de-identified] : Incisions clean and dry.    She has significantly less pain than before.  She is able to actively extend with an extensor lag of approximately 10 degrees to 15 degrees.  She is able to flex to 90.  There is minimal pain with flexion. [de-identified] : X-rays Today multiple views of the left knee show the same small lesion off of the superior pole of patella.  It is in the area of the quad and has not changed position.  There are no new largedestructive features. [de-identified] : As the patient has better active extension I would let her heal.  I do not think she needs an MRI or revision.  She does need radiation therapy.  While this will slow her healing I think would be appropriate to keep her in extension for another few weeks and then start bending her.  I like to see her again in 2 to 3 weeks and we can start bending her at that point. [de-identified] : Keep her in extension at this point.  She can come out of the brace for radiation therapy.  If imaging or pathology/biopsy was ordered, the patient was told to make an appointment to review findings right after all imaging is completed.  We discussed risks, benefits and alternatives. Rationale of care was reviewed and all questions were answered. Patient (and family) had all questions answered to her degree of the level of satisfaction. Patient (and family) expressed understanding and interest in proceeding with the plan as outlined.     This note was done with a voice recognition transcription software and any typos are related to this rather than medical error. Surgical risks reviewed. Patient (and family) had all questions answered to an agreeable level of satisfaction. Patient (and family) expressed understanding and interest in proceeding with the plan as outlined.

## 2024-01-16 ENCOUNTER — APPOINTMENT (OUTPATIENT)
Dept: ORTHOPEDIC SURGERY | Facility: CLINIC | Age: 29
End: 2024-01-16
Payer: MEDICAID

## 2024-01-16 PROCEDURE — 73560 X-RAY EXAM OF KNEE 1 OR 2: CPT | Mod: LT

## 2024-01-16 PROCEDURE — 99024 POSTOP FOLLOW-UP VISIT: CPT

## 2024-01-16 PROCEDURE — 77387C: CUSTOM

## 2024-01-16 NOTE — HISTORY OF PRESENT ILLNESS
[___ Months Post Op] : [unfilled] months post op [1] : the patient reports pain that is 1/10 in severity [Clean/Dry/Intact] : clean, dry and intact [Slow Progress] : is progressing slowly [No Sign of Infection] : is showing no signs of infection [Adequate Pain Control] : has adequate pain control [de-identified] : 11/9/2023 -radical resection of left knee sarcoma with quadriceps reinforcement [de-identified] : .Patient is doing better after her most recent injury.  She is moving significantly better.  She is happy with her response.  She is already started radiation therapy. [de-identified] : Incisions clean and dry.    She has significantly less pain than before.    She has almost no extensor lag with strength of at least 4 out of 5.  To 80 degrees with some stiffness. [de-identified] : X-rays Today multiple views of the left knee show the same small lesion off of the superior pole of patella.  It is in the area of the quad and has not changed position.  There are no new largedestructive features. [de-identified] : Patient is active extension is getting much better.  I am going to keep her in the brace but let her and and extend fully.  Have also sent her for physical therapy for gentle range of motion and pushing active extension however limiting the amount of weight that is put on this.  I still would let her walk on this with the brace carefully.  I will see her back again in 4 weeks and hopefully we can free up her from the brace. [de-identified] :   If imaging or pathology/biopsy was ordered, the patient was told to make an appointment to review findings right after all imaging is completed.  We discussed risks, benefits and alternatives. Rationale of care was reviewed and all questions were answered. Patient (and family) had all questions answered to her degree of the level of satisfaction. Patient (and family) expressed understanding and interest in proceeding with the plan as outlined.     This note was done with a voice recognition transcription software and any typos are related to this rather than medical error. Surgical risks reviewed. Patient (and family) had all questions answered to an agreeable level of satisfaction. Patient (and family) expressed understanding and interest in proceeding with the plan as outlined.

## 2024-01-17 ENCOUNTER — NON-APPOINTMENT (OUTPATIENT)
Age: 29
End: 2024-01-17

## 2024-01-17 VITALS
HEIGHT: 62 IN | OXYGEN SATURATION: 99 % | DIASTOLIC BLOOD PRESSURE: 68 MMHG | HEART RATE: 81 BPM | BODY MASS INDEX: 40.57 KG/M2 | RESPIRATION RATE: 18 BRPM | WEIGHT: 220.46 LBS | SYSTOLIC BLOOD PRESSURE: 107 MMHG | TEMPERATURE: 96.8 F

## 2024-01-17 PROCEDURE — 77387C: CUSTOM

## 2024-01-17 NOTE — HISTORY OF PRESENT ILLNESS
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 The mass is intimately associated with the distal quadriceps tendon with irregularity of the superficial surface of the rectus femoris tendon. Mild insertion distally towards the superior aspect of the patella along the prepatellar bursa.Left knee biopsy 10/19/23 revealed malignant neoplasm with melanocytic differentiation; clear cell is favored.  PET/CT scan  11/8/23 1. Intense FDG uptake is noted in known left anterior knee mass.  2.  Tiny less than 5 mm left-sided popliteal focus is of uncertain clinical significance. A popliteal node cannot be excluded. Correlation with recent MRI from 10/5/2023 is recommended.  3.  Nonspecific small left hepatic lobe segment 3 focus with no clear CT correlate is noted. Correlation with a CT or MRI of the liver is recommended as clinically indicated.  4. Multiple bilateral small subcentimeter lung nodules are noted most of which represent calcified granulomas as well as FDG avid mediastinal and hilar lymph nodes which may also represent granulomatous disease, although metastasis cannot be excluded and correlation with a chest CT is recommended.  11/10/2023 S/p radical resection of the left knee sarcoma including resection of periosteum of the patella as well as superficial portion of the quad tendon.   Pathology  1-Left anterior knee mass  Final Diagnosis 1. Left anterior knee mass, resection: - Clear cell sarcoma (see note). - Tumor size is 5.1 x 5.0 x 1.8 cm.  - Tumor involves adipose tissue and the fascia. - Mitotic rate is 3/10 HPFs. - Necrosis is present (<5%). - Deep margin is positive for tumor.  Synoptic Summary 1: Soft Tissue - Resection Clinical Preresection Treatment:   No known preresection therapy Specimen Procedure:  Intralesional resection Tumor Tumor Focality:   Unifocal Tumor Site:  Trunk and extremities - left anterior knee Tumor Size:  5.1 x 5.0 x 1.8 Centimeters (cm) Histologic Type (WHO):   Clear cell sarcoma of soft tissue Histologic Grade (FNCLCC):   Ungraded sarcoma Mitotic Rate:   3 mitoses per 10 high-power fields (HPF) Necrosis (macroscopic or microscopic):    Present Extent of Necrosis:   Cannot be determined - <5% Treatment Effect:   No known presurgical therapy Lymphovascular Invasion:   Not identified Margins Margin Status:   Tumor present at margin Margin(s) Involved by Tumor:   deep Regional Lymph Nodes Regional Lymph Node Status:   Not applicable (no regional lymph nodes submitted or found) Pathologic Stage Classification (pTNM, AJCC 8th Edition) Pathologic Stage Classification:    Histologic type appropriate for staging pT Category:  pT2 pN Category:   pN not assigned (no nodes submitted or found)  1/10/2024 Presents today for OTV. Completed fx 3/23 to left leg. States fell and chipped something in her knee. Has to keep leg straight. Wearing brace. Instructed on moisturizing skin.   1/17/2024 OTV. Completed fx 7/23. Moisturizing skin as instructed. Continues to wear knee brace.

## 2024-01-17 NOTE — DISEASE MANAGEMENT
[Clinical] : TNM Stage: c [II] : II [TTNM] : 2 [NTNM] : 0 [MTNM] : 0 [de-identified] : 2238 [de-identified] : 9279 [de-identified] : Left knee

## 2024-01-17 NOTE — REASON FOR VISIT
[Routine On-Treatment] : a routine on-treatment visit for [Other: ___] : [unfilled] [TWNoteComboBox1] : False

## 2024-01-18 PROCEDURE — 77387C: CUSTOM

## 2024-01-19 PROCEDURE — 77014: CPT | Mod: 26

## 2024-01-22 PROCEDURE — 77427 RADIATION TX MANAGEMENT X5: CPT

## 2024-01-22 PROCEDURE — 77387B: CUSTOM | Mod: 26

## 2024-01-23 PROCEDURE — 77387B: CUSTOM | Mod: 26

## 2024-01-24 VITALS
HEIGHT: 62 IN | HEART RATE: 81 BPM | SYSTOLIC BLOOD PRESSURE: 118 MMHG | WEIGHT: 223.66 LBS | RESPIRATION RATE: 18 BRPM | TEMPERATURE: 98.24 F | OXYGEN SATURATION: 99 % | DIASTOLIC BLOOD PRESSURE: 79 MMHG | BODY MASS INDEX: 41.16 KG/M2

## 2024-01-24 PROCEDURE — 77387C: CUSTOM

## 2024-01-24 NOTE — DISEASE MANAGEMENT
[TTNM] : 2 [NTNM] : 0 [MTNM] : 0 [de-identified] : 8597 [de-identified] : 7360 [de-identified] : Left knee

## 2024-01-24 NOTE — HISTORY OF PRESENT ILLNESS
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 The mass is intimately associated with the distal quadriceps tendon with irregularity of the superficial surface of the rectus femoris tendon. Mild insertion distally towards the superior aspect of the patella along the prepatellar bursa.Left knee biopsy 10/19/23 revealed malignant neoplasm with melanocytic differentiation; clear cell is favored.  PET/CT scan  11/8/23 1. Intense FDG uptake is noted in known left anterior knee mass.  2.  Tiny less than 5 mm left-sided popliteal focus is of uncertain clinical significance. A popliteal node cannot be excluded. Correlation with recent MRI from 10/5/2023 is recommended.  3.  Nonspecific small left hepatic lobe segment 3 focus with no clear CT correlate is noted. Correlation with a CT or MRI of the liver is recommended as clinically indicated.  4. Multiple bilateral small subcentimeter lung nodules are noted most of which represent calcified granulomas as well as FDG avid mediastinal and hilar lymph nodes which may also represent granulomatous disease, although metastasis cannot be excluded and correlation with a chest CT is recommended.  11/10/2023 S/p radical resection of the left knee sarcoma including resection of periosteum of the patella as well as superficial portion of the quad tendon.   Pathology  1-Left anterior knee mass  Final Diagnosis 1. Left anterior knee mass, resection: - Clear cell sarcoma (see note). - Tumor size is 5.1 x 5.0 x 1.8 cm.  - Tumor involves adipose tissue and the fascia. - Mitotic rate is 3/10 HPFs. - Necrosis is present (<5%). - Deep margin is positive for tumor.  Synoptic Summary 1: Soft Tissue - Resection Clinical Preresection Treatment:   No known preresection therapy Specimen Procedure:  Intralesional resection Tumor Tumor Focality:   Unifocal Tumor Site:  Trunk and extremities - left anterior knee Tumor Size:  5.1 x 5.0 x 1.8 Centimeters (cm) Histologic Type (WHO):   Clear cell sarcoma of soft tissue Histologic Grade (FNCLCC):   Ungraded sarcoma Mitotic Rate:   3 mitoses per 10 high-power fields (HPF) Necrosis (macroscopic or microscopic):    Present Extent of Necrosis:   Cannot be determined - <5% Treatment Effect:   No known presurgical therapy Lymphovascular Invasion:   Not identified Margins Margin Status:   Tumor present at margin Margin(s) Involved by Tumor:   deep Regional Lymph Nodes Regional Lymph Node Status:   Not applicable (no regional lymph nodes submitted or found) Pathologic Stage Classification (pTNM, AJCC 8th Edition) Pathologic Stage Classification:    Histologic type appropriate for staging pT Category:  pT2 pN Category:   pN not assigned (no nodes submitted or found)  1/10/2024 Presents today for OTV. Completed fx 3/23 to left leg. States fell and chipped something in her knee. Has to keep leg straight. Wearing brace. Instructed on moisturizing skin.   1/17/2024 OTV. Completed fx 7/23. Moisturizing skin as instructed. Continues to wear knee brace.   1/24/2024 OTV. Completed fx 12/23. Skin with mild erythema. Doing well. Pain to left knee improving since fall.

## 2024-01-24 NOTE — REVIEW OF SYSTEMS
[Fatigue: Grade 0] : Fatigue: Grade 0 [Skin Hyperpigmentation: Grade 1 - Hyperpigmentation covering <10% BSA; no psychosocial impact] : Skin Hyperpigmentation: Grade 1 - Hyperpigmentation covering <10% BSA; no psychosocial impact [Dermatitis Radiation: Grade 1 - Faint erythema or dry desquamation] : Dermatitis Radiation: Grade 1 - Faint erythema or dry desquamation

## 2024-01-25 PROCEDURE — 77387B: CUSTOM | Mod: 26

## 2024-01-26 PROCEDURE — 77014: CPT | Mod: 26

## 2024-01-29 PROCEDURE — 77427 RADIATION TX MANAGEMENT X5: CPT

## 2024-01-29 PROCEDURE — 77387C: CUSTOM

## 2024-01-30 PROCEDURE — 77387B: CUSTOM | Mod: 26

## 2024-01-31 ENCOUNTER — NON-APPOINTMENT (OUTPATIENT)
Age: 29
End: 2024-01-31

## 2024-01-31 PROCEDURE — 77387B: CUSTOM | Mod: 26

## 2024-01-31 NOTE — VITALS
[Maximal Pain Intensity: 5/10] : 5/10 [Least Pain Intensity: 0/10] : 0/10 [80: Normal activity with effort; some signs or symptoms of disease.] : 80: Normal activity with effort; some signs or symptoms of disease.  [ECOG Performance Status: 2 - Ambulatory and capable of all self care but unable to carry out any work activities] : Performance Status: 2 - Ambulatory and capable of all self care but unable to carry out any work activities. Up and about more than 50% of waking hours [Maximal Pain Intensity: 3/10] : 3/10

## 2024-01-31 NOTE — HISTORY OF PRESENT ILLNESS
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 The mass is intimately associated with the distal quadriceps tendon with irregularity of the superficial surface of the rectus femoris tendon. Mild insertion distally towards the superior aspect of the patella along the prepatellar bursa.Left knee biopsy 10/19/23 revealed malignant neoplasm with melanocytic differentiation; clear cell is favored.  PET/CT scan  11/8/23 1. Intense FDG uptake is noted in known left anterior knee mass.  2.  Tiny less than 5 mm left-sided popliteal focus is of uncertain clinical significance. A popliteal node cannot be excluded. Correlation with recent MRI from 10/5/2023 is recommended.  3.  Nonspecific small left hepatic lobe segment 3 focus with no clear CT correlate is noted. Correlation with a CT or MRI of the liver is recommended as clinically indicated.  4. Multiple bilateral small subcentimeter lung nodules are noted most of which represent calcified granulomas as well as FDG avid mediastinal and hilar lymph nodes which may also represent granulomatous disease, although metastasis cannot be excluded and correlation with a chest CT is recommended.  11/10/2023 S/p radical resection of the left knee sarcoma including resection of periosteum of the patella as well as superficial portion of the quad tendon.   Pathology  1-Left anterior knee mass  Final Diagnosis 1. Left anterior knee mass, resection: - Clear cell sarcoma (see note). - Tumor size is 5.1 x 5.0 x 1.8 cm.  - Tumor involves adipose tissue and the fascia. - Mitotic rate is 3/10 HPFs. - Necrosis is present (<5%). - Deep margin is positive for tumor.  Synoptic Summary 1: Soft Tissue - Resection Clinical Preresection Treatment:   No known preresection therapy Specimen Procedure:  Intralesional resection Tumor Tumor Focality:   Unifocal Tumor Site:  Trunk and extremities - left anterior knee Tumor Size:  5.1 x 5.0 x 1.8 Centimeters (cm) Histologic Type (WHO):   Clear cell sarcoma of soft tissue Histologic Grade (FNCLCC):   Ungraded sarcoma Mitotic Rate:   3 mitoses per 10 high-power fields (HPF) Necrosis (macroscopic or microscopic):    Present Extent of Necrosis:   Cannot be determined - <5% Treatment Effect:   No known presurgical therapy Lymphovascular Invasion:   Not identified Margins Margin Status:   Tumor present at margin Margin(s) Involved by Tumor:   deep Regional Lymph Nodes Regional Lymph Node Status:   Not applicable (no regional lymph nodes submitted or found) Pathologic Stage Classification (pTNM, AJCC 8th Edition) Pathologic Stage Classification:    Histologic type appropriate for staging pT Category:  pT2 pN Category:   pN not assigned (no nodes submitted or found)  1/10/2024 Presents today for OTV. Completed fx 3/23 to left leg. States fell and chipped something in her knee. Has to keep leg straight. Wearing brace. Instructed on moisturizing skin.   1/17/2024 OTV. Completed fx 7/23. Moisturizing skin as instructed. Continues to wear knee brace.   1/24/2024 OTV. Completed fx 12/23. Skin with mild erythema. Doing well. Pain to left knee improving since fall.   1/31/2024 OTV. Completed fx 17/23. Swelling to left knee decreasing. Pain improving. Continues to do skin care as instructed.

## 2024-01-31 NOTE — DISEASE MANAGEMENT
[Clinical] : TNM Stage: c [II] : II [TTNM] : 2 [NTNM] : 0 [MTNM] : 0 [de-identified] : 2771 [de-identified] : 2743 [de-identified] : Left knee

## 2024-02-01 PROCEDURE — 77387B: CUSTOM | Mod: 26

## 2024-02-02 PROCEDURE — 77014: CPT | Mod: 26

## 2024-02-05 PROCEDURE — 77387B: CUSTOM | Mod: 26

## 2024-02-05 PROCEDURE — 77427 RADIATION TX MANAGEMENT X5: CPT

## 2024-02-06 PROCEDURE — 77387C: CUSTOM

## 2024-02-06 NOTE — ED ADULT NURSE NOTE - NS TRANSFER RESPONSE BELONGING
With history of alcoholic cirrhosis  Ethanol normal on admission  UDS negative  LFTs ordered  Ammonia normal  CT CAP: Hepatomegaly and diffuse hepatic steatosis. Liver is diffusely heterogeneous. Evaluation is limited due to the lack of IV contrast and tumor is not excluded. Given the history of cirrhosis, recommend MRI using liver mass protocol. No evidence of an acute inflammatory process.  Continue to monitor and outpatient follow up.    yes

## 2024-02-07 ENCOUNTER — NON-APPOINTMENT (OUTPATIENT)
Age: 29
End: 2024-02-07

## 2024-02-07 VITALS
RESPIRATION RATE: 18 BRPM | WEIGHT: 223.1 LBS | BODY MASS INDEX: 41.06 KG/M2 | DIASTOLIC BLOOD PRESSURE: 78 MMHG | SYSTOLIC BLOOD PRESSURE: 116 MMHG | TEMPERATURE: 98.1 F | OXYGEN SATURATION: 99 % | HEART RATE: 77 BPM | HEIGHT: 62 IN

## 2024-02-07 PROCEDURE — 77387C: CUSTOM

## 2024-02-07 NOTE — VITALS
[Maximal Pain Intensity: 3/10] : 3/10 [Least Pain Intensity: 0/10] : 0/10 [80: Normal activity with effort; some signs or symptoms of disease.] : 80: Normal activity with effort; some signs or symptoms of disease.  [ECOG Performance Status: 2 - Ambulatory and capable of all self care but unable to carry out any work activities] : Performance Status: 2 - Ambulatory and capable of all self care but unable to carry out any work activities. Up and about more than 50% of waking hours

## 2024-02-07 NOTE — HISTORY OF PRESENT ILLNESS
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 The mass is intimately associated with the distal quadriceps tendon with irregularity of the superficial surface of the rectus femoris tendon. Mild insertion distally towards the superior aspect of the patella along the prepatellar bursa.Left knee biopsy 10/19/23 revealed malignant neoplasm with melanocytic differentiation; clear cell is favored.  PET/CT scan  11/8/23 1. Intense FDG uptake is noted in known left anterior knee mass.  2.  Tiny less than 5 mm left-sided popliteal focus is of uncertain clinical significance. A popliteal node cannot be excluded. Correlation with recent MRI from 10/5/2023 is recommended.  3.  Nonspecific small left hepatic lobe segment 3 focus with no clear CT correlate is noted. Correlation with a CT or MRI of the liver is recommended as clinically indicated.  4. Multiple bilateral small subcentimeter lung nodules are noted most of which represent calcified granulomas as well as FDG avid mediastinal and hilar lymph nodes which may also represent granulomatous disease, although metastasis cannot be excluded and correlation with a chest CT is recommended.  11/10/2023 S/p radical resection of the left knee sarcoma including resection of periosteum of the patella as well as superficial portion of the quad tendon.   Pathology  1-Left anterior knee mass  Final Diagnosis 1. Left anterior knee mass, resection: - Clear cell sarcoma (see note). - Tumor size is 5.1 x 5.0 x 1.8 cm.  - Tumor involves adipose tissue and the fascia. - Mitotic rate is 3/10 HPFs. - Necrosis is present (<5%). - Deep margin is positive for tumor.  Synoptic Summary 1: Soft Tissue - Resection Clinical Preresection Treatment:   No known preresection therapy Specimen Procedure:  Intralesional resection Tumor Tumor Focality:   Unifocal Tumor Site:  Trunk and extremities - left anterior knee Tumor Size:  5.1 x 5.0 x 1.8 Centimeters (cm) Histologic Type (WHO):   Clear cell sarcoma of soft tissue Histologic Grade (FNCLCC):   Ungraded sarcoma Mitotic Rate:   3 mitoses per 10 high-power fields (HPF) Necrosis (macroscopic or microscopic):    Present Extent of Necrosis:   Cannot be determined - <5% Treatment Effect:   No known presurgical therapy Lymphovascular Invasion:   Not identified Margins Margin Status:   Tumor present at margin Margin(s) Involved by Tumor:   deep Regional Lymph Nodes Regional Lymph Node Status:   Not applicable (no regional lymph nodes submitted or found) Pathologic Stage Classification (pTNM, AJCC 8th Edition) Pathologic Stage Classification:    Histologic type appropriate for staging pT Category:  pT2 pN Category:   pN not assigned (no nodes submitted or found)  1/10/2024 Presents today for OTV. Completed fx 3/23 to left leg. States fell and chipped something in her knee. Has to keep leg straight. Wearing brace. Instructed on moisturizing skin.   1/17/2024 OTV. Completed fx 7/23. Moisturizing skin as instructed. Continues to wear knee brace.   1/24/2024 OTV. Completed fx 12/23. Skin with mild erythema. Doing well. Pain to left knee improving since fall.   1/31/2024 OTV. Completed fx 17/23. Swelling to left knee decreasing. Pain improving. Continues to do skin care as instructed.  2/7/24 OTV. Completed fx 22/23.

## 2024-02-08 PROCEDURE — 77387C: CUSTOM

## 2024-02-09 PROCEDURE — 77387C: CUSTOM

## 2024-02-12 PROCEDURE — 77387C: CUSTOM

## 2024-02-12 PROCEDURE — 77427 RADIATION TX MANAGEMENT X5: CPT

## 2024-02-13 PROCEDURE — 77387C: CUSTOM

## 2024-02-14 ENCOUNTER — APPOINTMENT (OUTPATIENT)
Dept: ORTHOPEDIC SURGERY | Facility: CLINIC | Age: 29
End: 2024-02-14
Payer: MEDICAID

## 2024-02-14 ENCOUNTER — NON-APPOINTMENT (OUTPATIENT)
Age: 29
End: 2024-02-14

## 2024-02-14 DIAGNOSIS — S76.109A UNSPECIFIED INJURY OF UNSPECIFIED QUADRICEPS MUSCLE, FASCIA AND TENDON, INITIAL ENCOUNTER: ICD-10-CM

## 2024-02-14 PROCEDURE — 99214 OFFICE O/P EST MOD 30 MIN: CPT

## 2024-02-14 PROCEDURE — 73562 X-RAY EXAM OF KNEE 3: CPT | Mod: LT

## 2024-02-14 PROCEDURE — 77387C: CUSTOM

## 2024-02-14 NOTE — HISTORY OF PRESENT ILLNESS
[___ Months Post Op] : [unfilled] months post op [0] : no pain reported [Clean/Dry/Intact] : clean, dry and intact [Slow Progress] : is progressing slowly [No Sign of Infection] : is showing no signs of infection [Adequate Pain Control] : has adequate pain control [de-identified] : 11/9/2023 -radical resection of left knee sarcoma with quadriceps reinforcement [de-identified] : .Patient is doing better after her most recent injury.  She has no pain.  She has significant redness and inflammation from the radiation currently in the front of the knee. [de-identified] : Incisions clean and dry. Range of motion is 0 to 100 degrees with 4+ out of 5 quad strength.  She does have significant erythema from the radiation.  The scar is spreading somewhat and we discussed using cream and/or silicone tape for this. [de-identified] : X-rays Today multiple views of the left knee show the same small lesion off of the superior pole of patella.  It is in the area of the quad and has not changed position.  There are no new largedestructive features. [de-identified] : Patient is much stronger.  She does not have episodes of buckling.  I would recommend taking the brace off as it is certainly bothering her where it is rubbing in the area of the the skin involvement from radiation.  I would like to see her again in a few weeks after she finishes radiation after she has her initial restaging studies.  She can continue physical therapy.  Weightbearing as tolerated. [de-identified] :   If imaging or pathology/biopsy was ordered, the patient was told to make an appointment to review findings right after all imaging is completed.  We discussed risks, benefits and alternatives. Rationale of care was reviewed and all questions were answered. Patient (and family) had all questions answered to her degree of the level of satisfaction. Patient (and family) expressed understanding and interest in proceeding with the plan as outlined.     This note was done with a voice recognition transcription software and any typos are related to this rather than medical error. Surgical risks reviewed. Patient (and family) had all questions answered to an agreeable level of satisfaction. Patient (and family) expressed understanding and interest in proceeding with the plan as outlined.

## 2024-02-14 NOTE — HISTORY OF PRESENT ILLNESS
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 The mass is intimately associated with the distal quadriceps tendon with irregularity of the superficial surface of the rectus femoris tendon. Mild insertion distally towards the superior aspect of the patella along the prepatellar bursa.Left knee biopsy 10/19/23 revealed malignant neoplasm with melanocytic differentiation; clear cell is favored.  PET/CT scan  11/8/23 1. Intense FDG uptake is noted in known left anterior knee mass.  2.  Tiny less than 5 mm left-sided popliteal focus is of uncertain clinical significance. A popliteal node cannot be excluded. Correlation with recent MRI from 10/5/2023 is recommended.  3.  Nonspecific small left hepatic lobe segment 3 focus with no clear CT correlate is noted. Correlation with a CT or MRI of the liver is recommended as clinically indicated.  4. Multiple bilateral small subcentimeter lung nodules are noted most of which represent calcified granulomas as well as FDG avid mediastinal and hilar lymph nodes which may also represent granulomatous disease, although metastasis cannot be excluded and correlation with a chest CT is recommended.  11/10/2023 S/p radical resection of the left knee sarcoma including resection of periosteum of the patella as well as superficial portion of the quad tendon.   Pathology  1-Left anterior knee mass  Final Diagnosis 1. Left anterior knee mass, resection: - Clear cell sarcoma (see note). - Tumor size is 5.1 x 5.0 x 1.8 cm.  - Tumor involves adipose tissue and the fascia. - Mitotic rate is 3/10 HPFs. - Necrosis is present (<5%). - Deep margin is positive for tumor.  Synoptic Summary 1: Soft Tissue - Resection Clinical Preresection Treatment:   No known preresection therapy Specimen Procedure:  Intralesional resection Tumor Tumor Focality:   Unifocal Tumor Site:  Trunk and extremities - left anterior knee Tumor Size:  5.1 x 5.0 x 1.8 Centimeters (cm) Histologic Type (WHO):   Clear cell sarcoma of soft tissue Histologic Grade (FNCLCC):   Ungraded sarcoma Mitotic Rate:   3 mitoses per 10 high-power fields (HPF) Necrosis (macroscopic or microscopic):    Present Extent of Necrosis:   Cannot be determined - <5% Treatment Effect:   No known presurgical therapy Lymphovascular Invasion:   Not identified Margins Margin Status:   Tumor present at margin Margin(s) Involved by Tumor:   deep Regional Lymph Nodes Regional Lymph Node Status:   Not applicable (no regional lymph nodes submitted or found) Pathologic Stage Classification (pTNM, AJCC 8th Edition) Pathologic Stage Classification:    Histologic type appropriate for staging pT Category:  pT2 pN Category:   pN not assigned (no nodes submitted or found)  1/10/2024 Presents today for OTV. Completed fx 3/23 to left leg. States fell and chipped something in her knee. Has to keep leg straight. Wearing brace. Instructed on moisturizing skin.   1/17/2024 OTV. Completed fx 7/23. Moisturizing skin as instructed. Continues to wear knee brace.   1/24/2024 OTV. Completed fx 12/23. Skin with mild erythema. Doing well. Pain to left knee improving since fall.   1/31/2024 OTV. Completed fx 17/23. Swelling to left knee decreasing. Pain improving. Continues to do skin care as instructed.  2/7/24 OTV. Completed fx 22/23.   2/14/2024 OTV Completed fx 4/10 to left knee CD. Moderate erythema to knee. Instructed to use Desitin and hydrocortisone 1% ointment.

## 2024-02-14 NOTE — DISEASE MANAGEMENT
[Clinical] : TNM Stage: c [II] : II [TTNM] : 2 [NTNM] : 0 [MTNM] : 0 [de-identified] : 647 [de-identified] : 8599 [de-identified] : Left knee CD

## 2024-02-15 PROCEDURE — 77387B: CUSTOM | Mod: 26

## 2024-02-16 PROCEDURE — 77014: CPT | Mod: 26

## 2024-02-20 PROBLEM — E78.5 HYPERLIPIDEMIA, UNSPECIFIED: Chronic | Status: ACTIVE | Noted: 2023-11-01

## 2024-02-20 PROBLEM — E66.9 OBESITY, UNSPECIFIED: Chronic | Status: ACTIVE | Noted: 2023-11-01

## 2024-02-20 PROBLEM — R22.42 LOCALIZED SWELLING, MASS AND LUMP, LEFT LOWER LIMB: Chronic | Status: ACTIVE | Noted: 2023-11-01

## 2024-02-20 PROCEDURE — 77427 RADIATION TX MANAGEMENT X5: CPT

## 2024-02-20 PROCEDURE — 77387B: CUSTOM | Mod: 26

## 2024-02-21 ENCOUNTER — NON-APPOINTMENT (OUTPATIENT)
Age: 29
End: 2024-02-21

## 2024-02-21 PROCEDURE — 77387B: CUSTOM | Mod: 26

## 2024-02-21 NOTE — HISTORY OF PRESENT ILLNESS
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 The mass is intimately associated with the distal quadriceps tendon with irregularity of the superficial surface of the rectus femoris tendon. Mild insertion distally towards the superior aspect of the patella along the prepatellar bursa.Left knee biopsy 10/19/23 revealed malignant neoplasm with melanocytic differentiation; clear cell is favored.  PET/CT scan  11/8/23 1. Intense FDG uptake is noted in known left anterior knee mass.  2.  Tiny less than 5 mm left-sided popliteal focus is of uncertain clinical significance. A popliteal node cannot be excluded. Correlation with recent MRI from 10/5/2023 is recommended.  3.  Nonspecific small left hepatic lobe segment 3 focus with no clear CT correlate is noted. Correlation with a CT or MRI of the liver is recommended as clinically indicated.  4. Multiple bilateral small subcentimeter lung nodules are noted most of which represent calcified granulomas as well as FDG avid mediastinal and hilar lymph nodes which may also represent granulomatous disease, although metastasis cannot be excluded and correlation with a chest CT is recommended.  11/10/2023 S/p radical resection of the left knee sarcoma including resection of periosteum of the patella as well as superficial portion of the quad tendon.   Pathology  1-Left anterior knee mass  Final Diagnosis 1. Left anterior knee mass, resection: - Clear cell sarcoma (see note). - Tumor size is 5.1 x 5.0 x 1.8 cm.  - Tumor involves adipose tissue and the fascia. - Mitotic rate is 3/10 HPFs. - Necrosis is present (<5%). - Deep margin is positive for tumor.  Synoptic Summary 1: Soft Tissue - Resection Clinical Preresection Treatment:   No known preresection therapy Specimen Procedure:  Intralesional resection Tumor Tumor Focality:   Unifocal Tumor Site:  Trunk and extremities - left anterior knee Tumor Size:  5.1 x 5.0 x 1.8 Centimeters (cm) Histologic Type (WHO):   Clear cell sarcoma of soft tissue Histologic Grade (FNCLCC):   Ungraded sarcoma Mitotic Rate:   3 mitoses per 10 high-power fields (HPF) Necrosis (macroscopic or microscopic):    Present Extent of Necrosis:   Cannot be determined - <5% Treatment Effect:   No known presurgical therapy Lymphovascular Invasion:   Not identified Margins Margin Status:   Tumor present at margin Margin(s) Involved by Tumor:   deep Regional Lymph Nodes Regional Lymph Node Status:   Not applicable (no regional lymph nodes submitted or found) Pathologic Stage Classification (pTNM, AJCC 8th Edition) Pathologic Stage Classification:    Histologic type appropriate for staging pT Category:  pT2 pN Category:   pN not assigned (no nodes submitted or found)  1/10/2024 Presents today for OTV. Completed fx 3/23 to left leg. States fell and chipped something in her knee. Has to keep leg straight. Wearing brace. Instructed on moisturizing skin.   1/17/2024 OTV. Completed fx 7/23. Moisturizing skin as instructed. Continues to wear knee brace.   1/24/2024 OTV. Completed fx 12/23. Skin with mild erythema. Doing well. Pain to left knee improving since fall.   1/31/2024 OTV. Completed fx 17/23. Swelling to left knee decreasing. Pain improving. Continues to do skin care as instructed.  2/7/24 OTV. Completed fx 22/23.   2/14/2024 OTV Completed fx 4/10 to left knee CD. Moderate erythema to knee. Instructed to use Desitin and hydrocortisone 1% ointment.   2/21/2024 OTV Completed fx 8/10. Moisturizing knee area 4 x a day as instructed. Mild to moderate discomfort/tightness to area.

## 2024-02-21 NOTE — VITALS
[Maximal Pain Intensity: 3/10] : 3/10 [80: Normal activity with effort; some signs or symptoms of disease.] : 80: Normal activity with effort; some signs or symptoms of disease.  [ECOG Performance Status: 2 - Ambulatory and capable of all self care but unable to carry out any work activities] : Performance Status: 2 - Ambulatory and capable of all self care but unable to carry out any work activities. Up and about more than 50% of waking hours [Least Pain Intensity: 2/10] : 2/10 [Pain Description/Quality: ___] : Pain description/quality: [unfilled] [Pain Duration: ___] : Pain duration: [unfilled] [Pain Location: ___] : Pain Location: [unfilled] [Pain Interferes with ADLs] : Pain does not interfere with activities of daily living [NoTreatment Scheduled] : no treatment scheduled

## 2024-02-21 NOTE — DISEASE MANAGEMENT
[Clinical] : TNM Stage: c [II] : II [TTNM] : 2 [NTNM] : 0 [MTNM] : 0 [de-identified] : 7510 [de-identified] : 3272 [de-identified] : Left knee CD

## 2024-02-22 PROCEDURE — 77387B: CUSTOM | Mod: 26

## 2024-02-23 PROCEDURE — 77427 RADIATION TX MANAGEMENT X5: CPT

## 2024-02-23 PROCEDURE — 77387B: CUSTOM | Mod: 26

## 2024-03-13 NOTE — ED ADULT TRIAGE NOTE - BP NONINVASIVE SYSTOLIC (MM HG)
Follow up with foot specialist regarding toe and your primary care provider about your ankle.    126

## 2024-04-01 ENCOUNTER — APPOINTMENT (OUTPATIENT)
Dept: RADIATION ONCOLOGY | Facility: CLINIC | Age: 29
End: 2024-04-01
Payer: MEDICAID

## 2024-04-01 VITALS
BODY MASS INDEX: 40.57 KG/M2 | RESPIRATION RATE: 18 BRPM | HEART RATE: 86 BPM | SYSTOLIC BLOOD PRESSURE: 116 MMHG | OXYGEN SATURATION: 98 % | DIASTOLIC BLOOD PRESSURE: 80 MMHG | HEIGHT: 62 IN | TEMPERATURE: 98.4 F | WEIGHT: 220.46 LBS

## 2024-04-01 PROCEDURE — 99024 POSTOP FOLLOW-UP VISIT: CPT

## 2024-04-01 NOTE — VITALS
[Least Pain Intensity: 0/10] : 0/10 [NoTreatment Scheduled] : no treatment scheduled [ECOG Performance Status: 1 - Restricted in physically strenuous activity but ambulatory and able to carry out work of a light or sedentary nature] : Performance Status: 1 - Restricted in physically strenuous activity but ambulatory and able to carry out work of a light or sedentary nature, e.g., light house work, office work [Maximal Pain Intensity: 0/10] : 0/10 [Pain Interferes with ADLs] : Pain interferes with activities of daily living. [80: Normal activity with effort; some signs or symptoms of disease.] : 80: Normal activity with effort; some signs or symptoms of disease.

## 2024-04-01 NOTE — REVIEW OF SYSTEMS
[Pruritus: Grade 0] : Pruritus: Grade 0 [Skin Hyperpigmentation: Grade 1 - Hyperpigmentation covering <10% BSA; no psychosocial impact] : Skin Hyperpigmentation: Grade 1 - Hyperpigmentation covering <10% BSA; no psychosocial impact [Dermatitis Radiation: Grade 0] : Dermatitis Radiation: Grade 0

## 2024-04-07 NOTE — REASON FOR VISIT
[Other: ___] : [unfilled] [[employer] ] : provided by [employer]   [Post-Treatment Evaluation] : post-treatment evaluation for [Interpreters_IDNumber] : [id number] [Interpreters_FullName] : Melchor [TWNoteComboBox1] : [place]

## 2024-04-07 NOTE — HISTORY OF PRESENT ILLNESS
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 Mitotic rate is 3/10 HPFs , no LVI. Completed RT  to left knee to a dose of 1800 cGy 2/2024.  4/1/2024: PTE Feels generally well. Ambulating without difficulty. Notes slight pain with stair climbing but not requiring analgesics. PT 2 x week.

## 2024-04-07 NOTE — PHYSICAL EXAM
[] : no respiratory distress [Oriented To Time, Place, And Person] : oriented to person, place, and time [Affect] : the affect was normal [Normal] : well developed, well nourished, in no acute distress [Respiration, Rhythm And Depth] : normal respiratory rhythm and effort [Musculoskeletal - Swelling] : no joint swelling [Range of Motion to Joints] : range of motion to joints [Nail Clubbing] : no clubbing  or cyanosis of the fingernails [de-identified] :  hyperpigmentation/hypopigmentation treated site

## 2024-04-23 ENCOUNTER — APPOINTMENT (OUTPATIENT)
Dept: OBGYN | Facility: CLINIC | Age: 29
End: 2024-04-23

## 2024-05-13 ENCOUNTER — INPATIENT (INPATIENT)
Facility: HOSPITAL | Age: 29
LOS: 1 days | Discharge: HOME | DRG: 681 | End: 2024-05-15
Attending: HOSPITALIST | Admitting: INTERNAL MEDICINE
Payer: MEDICAID

## 2024-05-13 VITALS
RESPIRATION RATE: 18 BRPM | WEIGHT: 214.95 LBS | SYSTOLIC BLOOD PRESSURE: 115 MMHG | DIASTOLIC BLOOD PRESSURE: 70 MMHG | HEART RATE: 101 BPM | TEMPERATURE: 99 F | OXYGEN SATURATION: 99 %

## 2024-05-13 DIAGNOSIS — Z97.5 PRESENCE OF (INTRAUTERINE) CONTRACEPTIVE DEVICE: Chronic | ICD-10-CM

## 2024-05-13 PROCEDURE — 99285 EMERGENCY DEPT VISIT HI MDM: CPT

## 2024-05-13 NOTE — ED ADULT TRIAGE NOTE - ESI TRIAGE ACUITY LEVEL, MLM
Lorazepam:    Medication(s) requested: lorazepam  Last office visit: 6/30/22  Next office visit: 7/17/23  Last refill given: 10/31/18  Last refill picked up from pharmacy: NO results from PDMP  Contract present: NO  Date of contract: NA  Last urine drug screen: NA      PDMP review: Criteria met. Forwarded to Physician/EDGAR for signature.         2

## 2024-05-13 NOTE — ED ADULT TRIAGE NOTE - CHIEF COMPLAINT QUOTE
patient ambulatory to triage, complains of left side chest and neck pain for past month, worsening chest pain over past day. no respiratory distress at triage.

## 2024-05-14 ENCOUNTER — RESULT REVIEW (OUTPATIENT)
Age: 29
End: 2024-05-14

## 2024-05-14 DIAGNOSIS — C79.9 SECONDARY MALIGNANT NEOPLASM OF UNSPECIFIED SITE: ICD-10-CM

## 2024-05-14 DIAGNOSIS — Z98.890 OTHER SPECIFIED POSTPROCEDURAL STATES: Chronic | ICD-10-CM

## 2024-05-14 LAB
ALBUMIN SERPL ELPH-MCNC: 3.8 G/DL — SIGNIFICANT CHANGE UP (ref 3.3–5)
ALP SERPL-CCNC: 52 U/L — SIGNIFICANT CHANGE UP (ref 40–120)
ALT FLD-CCNC: 24 U/L — SIGNIFICANT CHANGE UP (ref 12–78)
ANION GAP SERPL CALC-SCNC: 4 MMOL/L — LOW (ref 5–17)
APTT BLD: 30.7 SEC — SIGNIFICANT CHANGE UP (ref 24.5–35.6)
AST SERPL-CCNC: 29 U/L — SIGNIFICANT CHANGE UP (ref 15–37)
BASOPHILS # BLD AUTO: 0.02 K/UL — SIGNIFICANT CHANGE UP (ref 0–0.2)
BASOPHILS NFR BLD AUTO: 0.3 % — SIGNIFICANT CHANGE UP (ref 0–2)
BILIRUB SERPL-MCNC: 0.6 MG/DL — SIGNIFICANT CHANGE UP (ref 0.2–1.2)
BUN SERPL-MCNC: 21 MG/DL — SIGNIFICANT CHANGE UP (ref 7–23)
CALCIUM SERPL-MCNC: 9.1 MG/DL — SIGNIFICANT CHANGE UP (ref 8.5–10.1)
CHLORIDE SERPL-SCNC: 106 MMOL/L — SIGNIFICANT CHANGE UP (ref 96–108)
CO2 SERPL-SCNC: 25 MMOL/L — SIGNIFICANT CHANGE UP (ref 22–31)
CREAT SERPL-MCNC: 0.63 MG/DL — SIGNIFICANT CHANGE UP (ref 0.5–1.3)
EGFR: 124 ML/MIN/1.73M2 — SIGNIFICANT CHANGE UP
EOSINOPHIL # BLD AUTO: 0.06 K/UL — SIGNIFICANT CHANGE UP (ref 0–0.5)
EOSINOPHIL NFR BLD AUTO: 0.8 % — SIGNIFICANT CHANGE UP (ref 0–6)
GLUCOSE SERPL-MCNC: 97 MG/DL — SIGNIFICANT CHANGE UP (ref 70–99)
HCT VFR BLD CALC: 39.5 % — SIGNIFICANT CHANGE UP (ref 34.5–45)
HGB BLD-MCNC: 13.1 G/DL — SIGNIFICANT CHANGE UP (ref 11.5–15.5)
IMM GRANULOCYTES NFR BLD AUTO: 0.3 % — SIGNIFICANT CHANGE UP (ref 0–0.9)
INR BLD: 0.94 RATIO — SIGNIFICANT CHANGE UP (ref 0.85–1.18)
LYMPHOCYTES # BLD AUTO: 2.42 K/UL — SIGNIFICANT CHANGE UP (ref 1–3.3)
LYMPHOCYTES # BLD AUTO: 33.8 % — SIGNIFICANT CHANGE UP (ref 13–44)
MCHC RBC-ENTMCNC: 28.4 PG — SIGNIFICANT CHANGE UP (ref 27–34)
MCHC RBC-ENTMCNC: 33.2 GM/DL — SIGNIFICANT CHANGE UP (ref 32–36)
MCV RBC AUTO: 85.7 FL — SIGNIFICANT CHANGE UP (ref 80–100)
MONOCYTES # BLD AUTO: 0.52 K/UL — SIGNIFICANT CHANGE UP (ref 0–0.9)
MONOCYTES NFR BLD AUTO: 7.3 % — SIGNIFICANT CHANGE UP (ref 2–14)
NEUTROPHILS # BLD AUTO: 4.11 K/UL — SIGNIFICANT CHANGE UP (ref 1.8–7.4)
NEUTROPHILS NFR BLD AUTO: 57.5 % — SIGNIFICANT CHANGE UP (ref 43–77)
NT-PROBNP SERPL-SCNC: 111 PG/ML — SIGNIFICANT CHANGE UP (ref 0–125)
PLATELET # BLD AUTO: 294 K/UL — SIGNIFICANT CHANGE UP (ref 150–400)
POTASSIUM SERPL-MCNC: 3.7 MMOL/L — SIGNIFICANT CHANGE UP (ref 3.5–5.3)
POTASSIUM SERPL-SCNC: 3.7 MMOL/L — SIGNIFICANT CHANGE UP (ref 3.5–5.3)
PROT SERPL-MCNC: 6.9 GM/DL — SIGNIFICANT CHANGE UP (ref 6–8.3)
PROTHROM AB SERPL-ACNC: 10.6 SEC — SIGNIFICANT CHANGE UP (ref 9.5–13)
RBC # BLD: 4.61 M/UL — SIGNIFICANT CHANGE UP (ref 3.8–5.2)
RBC # FLD: 12.4 % — SIGNIFICANT CHANGE UP (ref 10.3–14.5)
SODIUM SERPL-SCNC: 135 MMOL/L — SIGNIFICANT CHANGE UP (ref 135–145)
TROPONIN I, HIGH SENSITIVITY RESULT: <3 NG/L — SIGNIFICANT CHANGE UP
WBC # BLD: 7.15 K/UL — SIGNIFICANT CHANGE UP (ref 3.8–10.5)
WBC # FLD AUTO: 7.15 K/UL — SIGNIFICANT CHANGE UP (ref 3.8–10.5)

## 2024-05-14 PROCEDURE — 70491 CT SOFT TISSUE NECK W/DYE: CPT | Mod: 26,MC

## 2024-05-14 PROCEDURE — 70460 CT HEAD/BRAIN W/DYE: CPT | Mod: 26,MC

## 2024-05-14 PROCEDURE — 88305 TISSUE EXAM BY PATHOLOGIST: CPT | Mod: 26

## 2024-05-14 PROCEDURE — 99254 IP/OBS CNSLTJ NEW/EST MOD 60: CPT

## 2024-05-14 PROCEDURE — 38505 NEEDLE BIOPSY LYMPH NODES: CPT

## 2024-05-14 PROCEDURE — 88342 IMHCHEM/IMCYTCHM 1ST ANTB: CPT

## 2024-05-14 PROCEDURE — 76942 ECHO GUIDE FOR BIOPSY: CPT | Mod: 26

## 2024-05-14 PROCEDURE — 88342 IMHCHEM/IMCYTCHM 1ST ANTB: CPT | Mod: 26

## 2024-05-14 PROCEDURE — 36415 COLL VENOUS BLD VENIPUNCTURE: CPT

## 2024-05-14 PROCEDURE — 80048 BASIC METABOLIC PNL TOTAL CA: CPT

## 2024-05-14 PROCEDURE — 85027 COMPLETE CBC AUTOMATED: CPT

## 2024-05-14 PROCEDURE — 88305 TISSUE EXAM BY PATHOLOGIST: CPT

## 2024-05-14 PROCEDURE — 73701 CT LOWER EXTREMITY W/DYE: CPT | Mod: MC,LT

## 2024-05-14 PROCEDURE — 88172 CYTP DX EVAL FNA 1ST EA SITE: CPT

## 2024-05-14 PROCEDURE — 71275 CT ANGIOGRAPHY CHEST: CPT | Mod: 26,MC

## 2024-05-14 PROCEDURE — 99223 1ST HOSP IP/OBS HIGH 75: CPT

## 2024-05-14 PROCEDURE — 76942 ECHO GUIDE FOR BIOPSY: CPT

## 2024-05-14 PROCEDURE — 74177 CT ABD & PELVIS W/CONTRAST: CPT | Mod: 26,MC

## 2024-05-14 PROCEDURE — 73701 CT LOWER EXTREMITY W/DYE: CPT | Mod: 26,LT

## 2024-05-14 RX ORDER — MORPHINE SULFATE 50 MG/1
4 CAPSULE, EXTENDED RELEASE ORAL EVERY 4 HOURS
Refills: 0 | Status: DISCONTINUED | OUTPATIENT
Start: 2024-05-14 | End: 2024-05-15

## 2024-05-14 RX ORDER — MORPHINE SULFATE 50 MG/1
2 CAPSULE, EXTENDED RELEASE ORAL EVERY 4 HOURS
Refills: 0 | Status: DISCONTINUED | OUTPATIENT
Start: 2024-05-14 | End: 2024-05-15

## 2024-05-14 RX ORDER — ONDANSETRON 8 MG/1
4 TABLET, FILM COATED ORAL EVERY 8 HOURS
Refills: 0 | Status: DISCONTINUED | OUTPATIENT
Start: 2024-05-14 | End: 2024-05-15

## 2024-05-14 RX ORDER — LANOLIN ALCOHOL/MO/W.PET/CERES
3 CREAM (GRAM) TOPICAL AT BEDTIME
Refills: 0 | Status: DISCONTINUED | OUTPATIENT
Start: 2024-05-14 | End: 2024-05-15

## 2024-05-14 RX ORDER — ENOXAPARIN SODIUM 100 MG/ML
40 INJECTION SUBCUTANEOUS EVERY 24 HOURS
Refills: 0 | Status: DISCONTINUED | OUTPATIENT
Start: 2024-05-14 | End: 2024-05-15

## 2024-05-14 RX ORDER — ACETAMINOPHEN 500 MG
3 TABLET ORAL
Qty: 0 | Refills: 0 | DISCHARGE
End: 2023-11-23

## 2024-05-14 RX ORDER — DICLOFENAC SODIUM 75 MG/1
1 TABLET, DELAYED RELEASE ORAL
Refills: 0 | DISCHARGE

## 2024-05-14 RX ORDER — ACETAMINOPHEN 500 MG
650 TABLET ORAL EVERY 6 HOURS
Refills: 0 | Status: DISCONTINUED | OUTPATIENT
Start: 2024-05-14 | End: 2024-05-15

## 2024-05-14 RX ADMIN — ENOXAPARIN SODIUM 40 MILLIGRAM(S): 100 INJECTION SUBCUTANEOUS at 21:30

## 2024-05-14 RX ADMIN — MORPHINE SULFATE 2 MILLIGRAM(S): 50 CAPSULE, EXTENDED RELEASE ORAL at 15:57

## 2024-05-14 RX ADMIN — MORPHINE SULFATE 2 MILLIGRAM(S): 50 CAPSULE, EXTENDED RELEASE ORAL at 10:13

## 2024-05-14 NOTE — ED ADULT NURSE NOTE - NSFALLUNIVINTERV_ED_ALL_ED
Bed/Stretcher in lowest position, wheels locked, appropriate side rails in place/Call bell, personal items and telephone in reach/Instruct patient to call for assistance before getting out of bed/chair/stretcher/Non-slip footwear applied when patient is off stretcher/[place] to call system/Physically safe environment - no spills, clutter or unnecessary equipment/Purposeful proactive rounding/Room/bathroom lighting operational, light cord in reach

## 2024-05-14 NOTE — ED ADULT NURSE NOTE - OBJECTIVE STATEMENT
pt. came in from home with c/o of small lump on her left side her neck accompanied with chest and back pain for the months, had surgery on her left knee (tumor), NKA, translated by Lorrie - [id number], alert and oriented, safety maintained applied

## 2024-05-14 NOTE — H&P ADULT - HISTORY OF PRESENT ILLNESS
28 y/o female with PMHx of clear cell sarcoma left knee s/p excision and tendon reconstruction and radiation therapy (completed in FEB 2024) who presents to  with CC of left neck pain/ chest pain.          PAST MEDICAL & SURGICAL HISTORY:  Hyperlipidemia  Mass of left knee  Obese  Mass in neck  Presence of IUD  S/P knee surgery        FAMILY HISTORY:    Social History:      Allergies:  No Known Allergies           28 y/o female with PMHx of clear cell sarcoma left knee s/p excision and tendon reconstruction and radiation therapy (completed in FEB 2024) who presents to  with CC of left neck pain/ chest pain.  Patient notes         PAST MEDICAL & SURGICAL HISTORY:  Hyperlipidemia  Mass of left knee  Obese  Mass in neck  Presence of IUD  S/P knee surgery        FAMILY HISTORY:    Social History:      Allergies:  No Known Allergies           28 y/o female with PMHx of clear cell sarcoma left knee s/p excision and tendon reconstruction and radiation therapy (completed in FEB 2024) who presents to  with CC of left neck pain/ chest pain.  Patient notes she hasn't been feeling well for the last few months.  She notes she has been having pain in her left neck as well as pain in her left groin.  Sx ongoing for close to 1 month.  Getting worse.  She last saw her rad onc doctor in FEB when she completed her radiation treatments.  She lost her insurance and hasn't been able to follow up since then.  She denies hx of metastatic disease.  When she was dx, it was all localized in her left knee.  She had excisional surgery and then radiation.  She still has pain and swelling in the left knee but overall better since surgery.  Swelling has persisted.  She was never told about any distant disease.  In the ER, patient had CT of the head/ neck/ chest/ abd/ pelvis.  This showed large left supraclavicular lymph node  up to 2.7 in diameter as well as left external iliac/ retroperitoneal masses/ ELISE.          PAST MEDICAL & SURGICAL HISTORY:  Hyperlipidemia  Mass of left knee  Obese  Mass in neck  Presence of IUD  S/P knee surgery        FAMILY HISTORY:  Patient denies famly hx.        Social History:    Occasional ETOH.    No smoking.   No drugs.        Allergies:  No Known Allergies

## 2024-05-14 NOTE — ED PROVIDER NOTE - CLINICAL SUMMARY MEDICAL DECISION MAKING FREE TEXT BOX
Patient with history of sarcoma presenting with left neck mass and chest pain, will PE study, metastatic workup.    Progress note 408 attempted admission to medicine who requested ENT to evaluate study, discussed with Jenise ENT PA at [facility] no need for transfer at this time recommending IR biopsy and oncologic workup, patient admitted to Dr. Hernandez.

## 2024-05-14 NOTE — H&P ADULT - NSHPREVIEWOFSYSTEMS_GEN_ALL_CORE
ROS:  General:  No fevers, chills, or unexplained weight loss  Skin: No rash or bothersome skin lesions  Musculoskeletal: No arthalgias, myalgias or joint swelling  Eyes: No visual changes or eye pain  Ears: No hearing loss , otorrhea or ear pain  Nose, Mouth, Throat: No nasal congestion, rhinorrhea, oral lesions, postnasal drip or sore throat  Cardio: No chest pain or palpitations. no lower extremity edema. no syncope. no claudication.   Respiratory: No cough, shortness of breath or wheezing   GI: No diarrhea, constipation, blood in stools, abdominal pain, vomiting or heartburn  : No urinary frequency, hematuria, incontinence, or dysuria  Neurologic: No headaches, parasthesias, confusion, dysarthria or gait instability  Psychiatric:  No anxiety or depression  Lymphatic:  No easy bruising, easy bleeding or swollen glands  Allergic: No itching, sneezing , watery eyes, clear rhinorrhea or recurrent infections ROS:  General:  No fevers, chills, or unexplained weight loss  Skin: No rash or bothersome skin lesions  Musculoskeletal: see hpi.    Eyes: No visual changes or eye pain  Ears: No hearing loss , otorrhea or ear pain  Nose, Mouth, Throat: No nasal congestion, rhinorrhea, oral lesions, postnasal drip or sore throat  Cardio: chest pain as per HPI.    Respiratory: No cough, shortness of breath or wheezing   GI: No diarrhea, constipation, blood in stools, abdominal pain, vomiting or heartburn  : No urinary frequency, hematuria, incontinence, or dysuria  Neurologic: No headaches, parasthesias, confusion, dysarthria or gait instability  Psychiatric:  No anxiety or depression  Lymphatic:  No easy bruising, easy bleeding or swollen glands  Allergic: No itching, sneezing , watery eyes, clear rhinorrhea or recurrent infections

## 2024-05-14 NOTE — H&P ADULT - NSHPPHYSICALEXAM_GEN_ALL_CORE
PEx  T(C): 37.5 (05-14-24 @ 09:37), Max: 37.5 (05-14-24 @ 09:37)  HR: 101 (05-14-24 @ 09:37) (98 - 103)  BP: 116/79 (05-14-24 @ 09:37) (115/70 - 128/72)  RR: 19 (05-14-24 @ 09:37) (18 - 21)  SpO2: 96% (05-14-24 @ 09:37) (96% - 99%)  Wt(kg): --  General:     Well appearing, well nourished in no distress, no identifying marks , scars, or tattoos.  Skin: no rash or prominent lesions  Head: normocephalic, atraumatic     Sinuses: non-tender  Nose: no external lesions, mucosa non-inflamed, septum and turbinates normal  Throat: no erythema, exudates or lesions.  Neck: Supple without lymphadenopathy. Thyroid no thyromegaly, no palpable thyroid nodules, no palpable nodules or masses, carotid arteries no bruits.   Breasts: No palpable masses or lesions.  Heart: RRR, no murmur or gallop.  Normal S1, S2.  No S3, S4.   Lungs: CTA bilaterally, no wheezes, rhonchi, rales.  Breathing unlabored.   Chest wall: Normal insp   Abdomen:  Soft, NT/ND, normal bowel sounds, no HSM, no masses.  No peritoneal signs.   Back: spine normal without deformity or tenderness.  Normal ROM   : Exam normal.  no inguinal hernias.  Extremities: No deformities, clubbing, cyanosis, or edema.  Musculoskeletal: Normal gait and station. No decreased range of motion, instability, atrophy or abnormal strength or tone in the head, neck, spine, ribs, pelvis or extremities.   Neurologic: CN 2-12 normal. Sensation to pain, touch and proprioception normal. DTRs normal in upper and lower extremities. No pathologic reflexes.  Motor normal.  Psychiatric: Oriented X3, intact recent and remote memory, judgement and insight, normal mood and affect. PEx  T(C): 37.5 (05-14-24 @ 09:37), Max: 37.5 (05-14-24 @ 09:37)  HR: 101 (05-14-24 @ 09:37) (98 - 103)  BP: 116/79 (05-14-24 @ 09:37) (115/70 - 128/72)  RR: 19 (05-14-24 @ 09:37) (18 - 21)  SpO2: 96% (05-14-24 @ 09:37) (96% - 99%)  Wt(kg): --  General:  NAD.  lying in bed.    Skin: no rash or prominent lesions  Head: normocephalic, atraumatic     Sinuses: non-tender  Nose: no external lesions, mucosa non-inflamed, septum and turbinates normal  Throat: no erythema, exudates or lesions.  Neck: supple.  enlarged supraclavicular node left side ~2.5 cm.  tender.    Breasts: No palpable masses or lesions.  Heart: RRR, no murmur or gallop.  Normal S1, S2.  No S3, S4.   Lungs: CTA bilaterally, no wheezes, rhonchi, rales.  Breathing unlabored.   Chest wall: Normal insp   Abdomen:  Soft, NT/ND, normal bowel sounds, no HSM, no masses.  No peritoneal signs.   Back: spine normal without deformity or tenderness.  Normal ROM   : Exam normal.  no inguinal hernias.  Extremities: left knee swollen/ edematous.  skin changes secodnary to radiation.    Musculoskeletal: Normal gait and station. No decreased range of motion, instability, atrophy or abnormal strength or tone in the head, neck, spine, ribs, pelvis or extremities.   Neurologic: CN 2-12 normal. Sensation to pain, touch and proprioception normal. DTRs normal in upper and lower extremities. No pathologic reflexes.  Motor normal.  Psychiatric: Oriented X3, intact recent and remote memory, judgement and insight, normal mood and affect.

## 2024-05-14 NOTE — H&P ADULT - ASSESSMENT
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  In the ER, patient had CT of the head/ neck/ chest/ abd/ pelvis which showed metastatic disease.  Left supraclavicular lymph node  up to 2.7 in diameter as well as left external iliac/ retroperitoneal masses/ ELISE.      #Metastatic Clear Cell Sarcoma:    Patient with hx sarcoma s/p excision Nov 2023 and completed radiation tx Feb 2024.    Now with neck pain/ abd pain and evidence of presumed recurrence/ spread of disease.    IR consult-- possible left supraclavicular node bx.    Onc eval-- d/w PA for ONC Minesh.    Patient was seeing Dr. Rozina diaz @ [facility].    Pain control.    Overall poor prognosis/ aggressive disease.      #DVT Proph:  Lovenox.       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  In the ER, patient had CT of the head/ neck/ chest/ abd/ pelvis which showed metastatic disease.  Left supraclavicular lymph node  up to 2.7 in diameter as well as left external iliac/ retroperitoneal masses/ ELISE.      #Metastatic Clear Cell Sarcoma:    Patient with hx sarcoma s/p excision Nov 2023 and completed radiation tx Feb 2024.    Now with neck pain/ abd pain and evidence of presumed recurrence/ spread of disease.    IR consult-- possible left supraclavicular node bx.    Onc eval-- d/w PA for ONC Minesh.    Patient was seeing Dr. Rozina diaz @ [facility].    Will also check CT left knee to see if residual disease or just metastatic distant disease.    Pain control.    Overall poor prognosis/ aggressive disease.      #DVT Proph:  Lovenox.

## 2024-05-14 NOTE — CONSULT NOTE ADULT - NS ATTEND AMEND GEN_ALL_CORE FT
Patient evaluated. Current imaging reviewed. Outpatient oncology records reviewed.  30 y/o female  with clear cell sarcoma of left knee. PET Nov 2023- no overt metastatic disease. 11/10/23  s/p radical resection of  left knee tumor ( Dr Ham Delgado)  Path clear cell sarcoma  5.1 cm  mitotic rate 3/10 necrosis < 5 % , involved deep margins pT2 Nx S/p adjuvant RT completed Feb 2024 ( Dr AMANDO Handy). Now suspicion for metastatic disease-   left supraclav, mediastinal, RP and iliac adenopathy.  Plan :  IR biopsy of L supraclav adenopathy.  If metastatic disease confirmed - she will need  systemic therapy- anthracycline based chemotherapy, also will need [facility] to evaluate if actionable mutations.

## 2024-05-14 NOTE — H&P ADULT - NSHPOUTPATIENTPROVIDERS_GEN_ALL_CORE
[facility]vel Hand[facility] [place]  Ortho:  Dr. Jaylon Chen

## 2024-05-14 NOTE — CONSULT NOTE ADULT - ASSESSMENT
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 Mild insertion distally towards the superior aspect of the patella along the prepatellar bursa.  - L knee bx 10/19/23 revealed malignant neoplasm with melanocytic differentiation; clear cell is favored  - 11/08/23 PET/CT completed ---> left anterior knee mass; tiny less than 5 mm left-sided popliteal focus; nonspecific small L hepatic lobe segment 3 focus with no clear CT correlate is noted; multiple bilateral small subcentimeter lung nodules are noted most of which represent calcified granulomas as well as FDG avid mediastinal and hilar lymph nodes which may also represent granulomatous disease  - 11/10/23; S/p radical resection of the L knee sarcoma including resection of periosteum of the patella as well as superficial portion of the quad tendon (Tumor involved adipose tissue and fascia; mitotic rate is 3/10 HPFs; necrosis is present (<5%); deep margins positive  - CT imaging this admission revealed mediastinal ELISE, largest up to 3.4 cm in short axis interposed between the left common; RP and L external iliac ELISE, some which appear partially necrotic up to 2.5cm   - Patient needs tissue bx to confirm suspected dx; IR consulted, hopeful to target supraclavicular LN 05/14 if schedule allows   - Will contact patient's primary Rad Onc to discuss case; patient will need close outpatient follow up with Med Onc to discuss bx findings and likely systemic therapy options   - Continue supportive measures as necessary         Minesh Lowery PA-C  Hematology / Oncology  [facility] [place]  [phone]

## 2024-05-14 NOTE — PATIENT PROFILE ADULT - NSPROPASSIVESMOKEEXPOSURE_GEN_A_NUR
ENDOCRINE FOLLOW-UP    Chief Complaint   Patient presents with   • Thyroid Problem     Follow up visit-Last appt.11/15/2022     HISTORY OF PRESENT ILLNESS:  Nolan Simmons is here for postablative hypothyroidism follow-up.  She is taking levothyroxine 100 mcg daily by itself in the morning.  Her last thyroid blood test was done approximately 4 months ago and she is aware that it was in the normal range.  She is not missed any thyroid doses.  She says that she would like to weigh 120 lb in believes that that is her ideal weight.  She has not lost any weight in the last 1 year.  Currently she is not following any specific diet.   REVIEW OF SYSTEMS:  Constitutional review weight gain of 3.5 kg since 11/15/2022 endocrine office visit noted on chart review.  No complains of severe fatigue.    Endocrine post ablative hypothyroidism treated with optimum TSH on levothyroxine 100 mcg daily.    No acute cardiovascular or respiratory complaints.    GI treated for acid reflux no acute complaints today.    Neurological no acute complaints.    Psychological positive for stress.    Gyn postmenopausal no other complaints.  Outpatient Medications Marked as Taking for the 11/29/23 encounter (Office Visit) with Anuj Simon MD   Medication Sig Dispense Refill   • levothyroxine 100 MCG tablet Take 1 tablet by mouth daily. KEEP SCHEDULED APPOINTMENT FOR FURTHER REFILLS 90 tablet 0   • ibuprofen (MOTRIN) 800 MG tablet Take 1 tablet by mouth every 8 hours as needed for Pain. 90 tablet 0   • albuterol 108 (90 Base) MCG/ACT inhaler Inhale 1 puff into the lungs every 4 hours as needed for Shortness of Breath or Wheezing. 1 each 1   • promethazine-dextromethorphan (PROMETHAZINE-DM) 6.25-15 MG/5ML syrup Take 5 mLs by mouth 4 times daily as needed for Cough. 120 mL 0   • cholecalciferol (VITAMIN D) 25 mcg (1,000 units) tablet      • ondansetron (ZOFRAN ODT) 4 MG disintegrating tablet DIS 1 T ON THE TONGUE TID FOR 4 DAYS PRN  0   •  triamcinolone (ARISTOCORT) 0.1 % cream Apply a thin layer to the affected area twice daily. 30 g 5   • cyclobenzaprine (FLEXERIL) 10 MG tablet Take 1 tablet by mouth 3 times daily as needed for Muscle spasms. 30 tablet 0   • omeprazole 20 MG tablet Take 1 tablet by mouth daily. 30 tablet 0   • multivitamin (THERAGRAN) per tablet Take 1 tablet by mouth daily.        ALLERGIES:   Allergen Reactions   • Penicillins    • Sulfa Antibiotics        Past Medical History:   Diagnosis Date   • Eczema    • Extrinsic asthma    • GERD (gastroesophageal reflux disease)    • Hypothyroidism     postablative hypothyroidism treated for Graves disease at [facility]   • Malaise and fatigue      Past Surgical History:   Procedure Laterality Date   •  section, classic     • Colonoscopy w/ biopsies  2019   • Tonsillectomy     • Tubal ligation       Family History   Problem Relation Age of Onset   • Hypertension Mother    • Genitourinary Mother         uterine polyps - benign   • COPD Mother    • Diabetes Father    • Cancer Sister         breast cancer   • Thyroid Sister         hypothyroidism   • Diabetes Sister    • Obesity Sister    • Diabetes Sister    • Obesity Brother    • Diabetes Brother    • Obesity Brother    • Diabetes Brother    • Obesity Brother    • Diabetes Brother    • Obesity Brother    • Diabetes Brother    • Cancer Other         Maternal niece     Social History     Tobacco Use   • Smoking status: Never   • Smokeless tobacco: Never   Vaping Use   • Vaping Use: never used   Substance Use Topics   • Alcohol use: No   • Drug use: No       PHYSICAL EXAMINATION:  Visit Vitals  /76 (BP Location: RUE - Right upper extremity, Patient Position: Sitting, Cuff Size: Large Adult)   Pulse 66   Ht 5' 1\" (1.549 m)   Wt 78 kg (172 lb)   LMP  (LMP Unknown)   BMI 32.50 kg/m²     Body mass index is 32.5 kg/m².  Constitutional:  Well developed, well nourished, no acute distress, non-toxic appearance.   Eyes:   Pupils equal, eye movements, sclerae, conjunctivae normal.  HENT:  Atraumatic, external ears normal, external nose and mouth covered by face mask.  Neck- Normal range of motion, no tenderness, supple.  Thyroid gland is not palpable.  Respiratory:  No respiratory distress, normal breath sounds, no rales, no wheezing.   Cardiovascular:  Normal rate, normal rhythm, no murmurs, no gallops, no rubs.   Musculoskeletal:  No edema, no tenderness, no deformities.  Back- No tenderness.  Integument:  Well hydrated, no rash.   Lymphatic:  No palpable anterior/posterior cervical lymphadenopathy noted.   Neurologic:  Alert and oriented x3, CN (cranial nerves) 2-12 normal, normal motor function, normal sensory function, no focal deficits noted.   Psychiatric:  Speech and behavior appropriate.     LABS:  Dates              TSH         Free T4  7/14/2023       0.909          --  11/11/2022     27.71          0.9  5/24/2022       1.053  7/2/2021       <0.008          2.0  6/20/2020       0.033           1.5  TSH (mcUnits/mL)   Date Value   07/14/2023 0.909     T4, Free (ng/dL)   Date Value   11/11/2022 0.9     Sodium (mmol/L)   Date Value   07/14/2023 141     Potassium (mmol/L)   Date Value   07/14/2023 4.7     Chloride (mmol/L)   Date Value   07/14/2023 109     Glucose (mg/dL)   Date Value   07/14/2023 89     Calcium (mg/dL)   Date Value   07/14/2023 9.7     Carbon Dioxide (mmol/L)   Date Value   07/14/2023 27     BUN (mg/dL)   Date Value   07/14/2023 21 (H)     Creatinine (mg/dL)   Date Value   07/14/2023 0.90     GOT/AST (Units/L)   Date Value   07/14/2023 15     GPT/ALT (Units/L)   Date Value   07/14/2023 24     No results found for: \"GGTP\"  Alkaline Phosphatase (Units/L)   Date Value   07/14/2023 112     Bilirubin, Total (mg/dL)   Date Value   07/14/2023 0.5     WBC (K/mcL)   Date Value   07/07/2015 3.9 (L)     RBC (mil/mcL)   Date Value   07/07/2015 4.33     HCT (%)   Date Value   07/07/2015 39.6     HGB (g/dL)   Date Value    07/07/2015 12.9     PLT (K/mcL)   Date Value   07/07/2015 333     ASSESSMENT   1. Postablative hypothyroidism      DISCUSSION:  I discussed with her that TSH blood test is recommended today she will be notified of results and recommendations.  I advised her to continue present dose of levothyroxine for now.  I explained to her that if she follows a lower calorie diet plan she could lose weight weight loss down to 150 lb can get her within 5 lb of her ideal body weight range she does not need to weigh 125 lb to be at goal weight.    PLAN:  Recommend to do thyroid blood test today.  Continue levothyroxine dose to 100 mcg daily.  Levothyroxine must be taken before breakfast with water only multivitamins fiber iron supplements interfere with its absorption so should be taken at least 2 hours later.    Recommend that you do another thyroid blood test 1 week before follow-up visit orders are in the system.    Recommend to follow-up October 2024.     Yes

## 2024-05-14 NOTE — H&P ADULT - NSHPLABSRESULTS_GEN_ALL_CORE
13.1   7.15  )-----------( 294      ( 14 May 2024 01:18 )             39.5     05-14    135  |  106  |  21  ----------------------------<  97  3.7   |  25  |  0.63    Ca    9.1      14 May 2024 01:18    TPro  6.9  /  Alb  3.8  /  TBili  0.6  /  DBili  x   /  AST  29  /  ALT  24  /  AlkPhos  52  05-14    CAPILLARY BLOOD GLUCOSE        PT/INR - ( 14 May 2024 01:18 )   PT: 10.6 sec;   INR: 0.94 ratio         PTT - ( 14 May 2024 01:18 )  PTT:30.7 sec  Urinalysis Basic - ( 14 May 2024 01:18 )    Color: x / Appearance: x / SG: x / pH: x  Gluc: 97 mg/dL / Ketone: x  / Bili: x / Urobili: x   Blood: x / Protein: x / Nitrite: x   Leuk Esterase: x / RBC: x / WBC x   Sq Epi: x / Non Sq Epi: x / Bacteria: x 13.1   7.15  )-----------( 294      ( 14 May 2024 01:18 )             39.5     05-14    135  |  106  |  21  ----------------------------<  97  3.7   |  25  |  0.63    Ca    9.1      14 May 2024 01:18    TPro  6.9  /  Alb  3.8  /  TBili  0.6  /  DBili  x   /  AST  29  /  ALT  24  /  AlkPhos  52  05-14    CAPILLARY BLOOD GLUCOSE        PT/INR - ( 14 May 2024 01:18 )   PT: 10.6 sec;   INR: 0.94 ratio      PTT - ( 14 May 2024 01:18 )  PTT:30.7 sec    Urinalysis Basic - ( 14 May 2024 01:18 )  Color: x / Appearance: x / SG: x / pH: x  Gluc: 97 mg/dL / Ketone: x  / Bili: x / Urobili: x   Blood: x / Protein: x / Nitrite: x   Leuk Esterase: x / RBC: x / WBC x   Sq Epi: x / Non Sq Epi: x / Bacteria: x    < from: CT Angio Chest PE Protocol w/ IV Cont (05.14.24 @ 02:03) >    IMPRESSION:    1. No evidence of PE.    2. Metastatic disease, as above. Nonemergent PET/CT recommended for   further evaluation.    < end of copied text >    < from: CT Neck Soft Tissue w/ IV Cont (05.14.24 @ 02:01) >     head: No acute intracranial hemorrhage, mass effect, or midline shift.   No abnormal intracranial contrast enhancement is identified. If not   contraindicated, a contrast-enhanced MRI would provide more sensitive   evaluation for intracranial metastatic disease.    CT Neck: Partially visualized superior mediastinal and left   supraclavicular adenopathy, as above with associated mild mass effect,   new/increased comparedwith 11/8/2023.    < end of copied text >

## 2024-05-14 NOTE — PATIENT PROFILE ADULT - FALL HARM RISK - RISK INTERVENTIONS

## 2024-05-14 NOTE — PATIENT PROFILE ADULT - NSPROLASTMENSTRUAL_GEN_A_NUR
Recent Labs     04/04/21  2103 04/05/21  1325   AST 31 766*   ALT 32 584*   ALKPHOS 78 110   TBILI 0 30 2 80*   · Patient found to have significant increase in LFTs from yesterday to today  · GI consultation  · Repeat CMP in a m  no period for 6 months  I have a iud

## 2024-05-14 NOTE — PATIENT PROFILE ADULT - VISION (WITH CORRECTIVE LENSES IF THE PATIENT USUALLY WEARS THEM):
wears glasses left at home/Normal vision: sees adequately in most situations; can see medication labels, newsprint

## 2024-05-14 NOTE — H&P ADULT - NSVTERISKREFERASSESS_GEN_ALL_CORE
Ja Gilliland DO;  Location: Good Samaritan University Surgeon: Luis Anderson MD;  Location: [facility] OR;  CST       Refer to the Assessment tab to view/cancel completed assessment.

## 2024-05-14 NOTE — ED PROVIDER NOTE - CADM POA CENTRAL LINE
HISTORY & PHYSICAL PRIOR TO GI (GASTROINTESTINAL) PROCEDURE      Procedure Date:  2024    Patient: Alexander Heart  : 1970    [place] [facility] [place]    Outpatient History & Physical Review for Colonoscopy     Indications:  Positive home screening test (positive FIT 2024)    Never had colonoscopy.  Denies any rectal bleeding or change in bowel habit.  Denies any abdominal surgery.  Has family history of colon cancer affecting his grandfather at age early 60s.    Current Facility-Administered Medications   Medication Dose Route Frequency Provider Last Rate Last Admin    metoPROLOL tartrate (LOPRESSOR) tablet 25 mg  25 mg Oral Once PRN Veronica Barajas CRNA        dextrose 50 % injection 25 g  25 g Intravenous PRN Veronica Barajas CRNA        insulin regular (human) (HumuLIN R, NovoLIN R) sliding scale injection   Subcutaneous Once PRN Veronica Barajas CRNA        sodium chloride 0.9 % flush bag 25 mL  25 mL Intravenous PRN Veronica Barajas, CRNA        sodium chloride 0.9 % injection 2 mL  2 mL Intracatheter 2 times per day Veronica Barajas CRNA        lactated ringers infusion   Intravenous Continuous Veronica Barajas CRNA        dextrose 5 % infusion   Intravenous Continuous PRN Veronica Barajas, CRNA           ALLERGIES:  Allergy, Amoxicillin, Augmentin [amoclan], and Soap            Past Medical History:   Diagnosis Date    DM (diabetes mellitus) (CMD)     HLD (hyperlipidemia)     HTN (hypertension)      Past Surgical History:   Procedure Laterality Date    Hb ablation-svt      Knee arthroscopy w/ meniscectomy  2005    Wrist surgery      left wrist x2 (ganglion cyst) age 8 and 20         PHYSICAL EXAM:  HEENT: Within normal limits  LUNGS: Good respiratory effort,  No cold symptoms present.  HEART: S1,S2, .  NEUROLOGICAL: Normal level of consciousness, able to answer questions, interactive.  SKIN: Warm, dry and intact, no lesions or rashes.   GENITOURINARY: N\A    The risks of the  procedure including the possibility of bleeding, perforation (possibly resulting in laparotomy/colostomy) aspiration, and the risk of a polypectomy, and missed polyps were discussed with the patient who accepts these risks.           No

## 2024-05-14 NOTE — ED PROVIDER NOTE - PHYSICAL EXAMINATION
GEN - NAD; well appearing; A+O x3  HEAD - NC/AT    EYES - EOMI, no conjunctival pallor, no scleral icterus  ENT -   left lower neck with 2-1/2 cm lymph node, mobile, firm  PULM - CTA b/l,  symmetric breath sounds  COR -  RRR, S1 S2, no murmurs  ABD - ND, NT, soft, no guarding, no rebound, no masses    EXTREMS -no edema, no deformity, warm and well perfused   SKIN - no rash or bruising      NEUROLOGIC - alert, sensation nl, motor 5/5 RUE/LUE/RLE/LLE

## 2024-05-14 NOTE — CONSULT NOTE ADULT - SUBJECTIVE AND OBJECTIVE BOX
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  Patient noted she hasn't been feeling well for the last few months, having pain in her L neck as well as L groin. She stated that she just recently completed her RT with [facility] Dr Handy this past 02/2024.  She admitted that she lost her insurance and hasn't been able to follow up since then. She understands her suspected dx at this time but noted that when she was first dx, it was all localized in her L knee. She is upset, but denied any other acute c/o at this time.      05/14/24:       PAST MEDICAL & SURGICAL HISTORY:    Hyperlipidemia    Mass of left knee    Obese    Mass in neck    Presence of IUD    S/P knee surgery        MEDICATIONS  (STANDING):    enoxaparin Injectable 40 milliGRAM(s) SubCutaneous every 24 hours      MEDICATIONS  (PRN):    acetaminophen     Tablet .. 650 milliGRAM(s) Oral every 6 hours PRN Temp greater or equal to 38C (100.4F), Mild Pain (1 - 3)  aluminum hydroxide/magnesium hydroxide/simethicone Suspension 30 milliLiter(s) Oral every 4 hours PRN Dyspepsia  melatonin 3 milliGRAM(s) Oral at bedtime PRN Insomnia  morphine  - Injectable 2 milliGRAM(s) IV Push every 4 hours PRN Moderate Pain (4 - 6)  morphine  - Injectable 4 milliGRAM(s) IV Push every 4 hours PRN Severe Pain (7 - 10)  ondansetron Injectable 4 milliGRAM(s) IV Push every 8 hours PRN Nausea and/or Vomiting      ALLERGIES:    No Known Allergies    Intolerances        FAMILY HISTORY:    Nothing noted      REVIEW OF SYSTEMS:    Constitutional, Eyes, ENT, Cardiovascular, Respiratory, Gastrointestinal, Genitourinary, Musculoskeletal, Integumentary, Neurological, Psychiatric, Endocrine, Heme/Lymph and Allergic/Immunologic review of systems are otherwise negative except as noted in HPI.       VITALS:    T(C): 37.5 (14 May 2024 09:37), Max: 37.5 (14 May 2024 09:37)  T(F): 99.5 (14 May 2024 09:37), Max: 99.5 (14 May 2024 09:37)  HR: 101 (14 May 2024 09:37) (98 - 103)  BP: 116/79 (14 May 2024 09:37) (115/70 - 128/72)  BP(mean): 84 (14 May 2024 06:00) (83 - 85)  RR: 19 (14 May 2024 09:37) (18 - 21)  SpO2: 96% (14 May 2024 09:37) (96% - 99%)    Parameters below as of 14 May 2024 09:37  Patient On (Oxygen Delivery Method): room air        PHYSICAL:    Constitutional:   Eyes: no conjunctival infection, anicteric  ENT: pharynx is unremarkable  Neck: supple without JVD  Pulmonary: clear to auscultation bilaterally  Cardiac: RRR   Vascular: no calf tenderness, venous stasis changes  Abdomen: normoactive bowel sounds, soft and nontender, no hepatosplenomegaly or masses appreciated.   Lymphatic: no peripheral adenopathy appreciated.   Musculoskeletal: full range of motion and no deformities appreciated.   Skin: normal appearance, no rash  Neurology:       LABS:    CBC Full  -  ( 14 May 2024 01:18 )  WBC Count : 7.15 K/uL  RBC Count : 4.61 M/uL  Hemoglobin : 13.1 g/dL  Hematocrit : 39.5 %  Platelet Count - Automated : 294 K/uL  Mean Cell Volume : 85.7 fl  Mean Cell Hemoglobin : 28.4 pg  Mean Cell Hemoglobin Concentration : 33.2 gm/dL  Auto Neutrophil # : 4.11 K/uL  Auto Lymphocyte # : 2.42 K/uL  Auto Monocyte # : 0.52 K/uL  Auto Eosinophil # : 0.06 K/uL  Auto Basophil # : 0.02 K/uL  Auto Neutrophil % : 57.5 %  Auto Lymphocyte % : 33.8 %  Auto Monocyte % : 7.3 %  Auto Eosinophil % : 0.8 %  Auto Basophil % : 0.3 %    05-14    135  |  106  |  21  ----------------------------<  97  3.7   |  25  |  0.63    Ca    9.1      14 May 2024 01:18    TPro  6.9  /  Alb  3.8  /  TBili  0.6  /  DBili  x   /  AST  29  /  ALT  24  /  AlkPhos  52  05-14    PT/INR - ( 14 May 2024 01:18 )   PT: 10.6 sec;   INR: 0.94 ratio         PTT - ( 14 May 2024 01:18 )  PTT:30.7 sec  Urinalysis Basic - ( 14 May 2024 01:18 )    Color: x / Appearance: x / SG: x / pH: x  Gluc: 97 mg/dL / Ketone: x  / Bili: x / Urobili: x   Blood: x / Protein: x / Nitrite: x   Leuk Esterase: x / RBC: x / WBC x   Sq Epi: x / Non Sq Epi: x / Bacteria: x        RADIOLOGY & ADDITIONAL STUDIES:      CT Neck Soft Tissue w/ IV Cont (05.14.24 @ 02:01)    IMPRESSION:    CT head: No acute intracranial hemorrhage, mass effect, or midline shift.   No abnormal intracranial contrast enhancement is identified. If not   contraindicated, a contrast-enhanced MRI would provide more sensitive   evaluation for intracranial metastatic disease.    CT Neck: Partially visualized superior mediastinal and left   supraclavicular adenopathy, as above with associated mild mass effect,   new/increased comparedwith 11/8/2023.        CT Angio Chest PE Protocol w/ IV Cont (05.14.24 @ 02:03)    FINDINGS: Motion artifact degrades image quality and limits evaluation.  CHEST:  LUNGS AND LARGE AIRWAYS: Patent central airways. Atelectatic changes   involving both lower lobes. A few subcentimetercalcified granulomatous   are seen scattered throughout both lungs.  PLEURA: No pleural effusion.  VESSELS: Within normal limits. No evidence of PE.  HEART: Heart is of upper limits of normal. No pericardial effusion.  MEDIASTINUM AND JONEL: Mediastinal lymphadenopathy, largest of which   measures up to 3.4 cm in short axis interposed between the left common   carotid and subclavian arteries, likely representing metastatic disease,   interval worsening. Mild bilateral hilar lymphadenopathy also noted.  CHEST WALL AND LOWER NECK: Within normal limits.    ABDOMEN AND PELVIS:  LIVER: Within normal limits.  BILE DUCTS: Normal caliber.  GALLBLADDER: Within normal limits.  SPLEEN: Within normal limits.  PANCREAS: Within normal limits.  ADRENALS: Within normal limits.  KIDNEYS/URETERS: Within normal limits.    BLADDER: Within normal limits.  REPRODUCTIVE ORGANS: A 2 cm right ovarian hemorrhagic cyst is noted. IUD   seen within the endometrial cavity.    BOWEL: No bowel obstruction. Appendix is normal.  PERITONEUM: Trace pelvic fluid, likely physiologic.  VESSELS: Within normal limits.  RETROPERITONEUM/LYMPH NODES: Retroperitoneal and left external iliac   lymphadenopathy, some which appear partially necrotic suggesting   metastatic disease, measuring up to 2.5 cm in short axis, not seen on   prior study. Shotty subcentimeter soft tissue density masses are noted in   the left retroperitoneum, likely representing metastatic disease, not   seen on prior study.  ABDOMINAL WALL: Tiny bilateralfat-containing indirect inguinal hernias.  BONES: Within normal limits.    IMPRESSION:    1. No evidence of PE.    2. Metastatic disease, as above. Nonemergent PET/CT recommended for   further evaluation.        CT Knee w/ IV Cont, Left (05.14.24 @ 11:05)    IMPRESSION:  1.  No definite mass lesion or nodular/masslike enhancement seen on CT.   For better assessment of possible residual/recurrent disease, recommend   dedicated knee MRI with and without contrast.  2.  Posttreatment changes of the patella, distal femur, and proximal   tibia.  3.  Mild prepatellar bursitis.     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  Patient noted she hasn't been feeling well for the last few months, having pain in her L neck as well as L groin. She stated that she just recently completed her RT with [facility] Dr Handy this past 02/2024.  She admitted that she lost her insurance and hasn't been able to follow up since then. She understands her suspected dx at this time but noted that when she was first dx, it was all localized in her L knee. She is upset, but denied any other acute c/o at this time.      05/14/24: Patient seen at bedside;  #[id number], no acute distress, still having neck and chest/abdomen discomfort, understanding of suspected dx, but overall does not seem to have good support; lives in house with boyfriend in [facility], no insurance because of issues with migration papers      PAST MEDICAL & SURGICAL HISTORY:    Hyperlipidemia    Mass of left knee    Obese    Mass in neck    Presence of IUD    S/P knee surgery        MEDICATIONS  (STANDING):    enoxaparin Injectable 40 milliGRAM(s) SubCutaneous every 24 hours      MEDICATIONS  (PRN):    acetaminophen     Tablet .. 650 milliGRAM(s) Oral every 6 hours PRN Temp greater or equal to 38C (100.4F), Mild Pain (1 - 3)  aluminum hydroxide/magnesium hydroxide/simethicone Suspension 30 milliLiter(s) Oral every 4 hours PRN Dyspepsia  melatonin 3 milliGRAM(s) Oral at bedtime PRN Insomnia  morphine  - Injectable 2 milliGRAM(s) IV Push every 4 hours PRN Moderate Pain (4 - 6)  morphine  - Injectable 4 milliGRAM(s) IV Push every 4 hours PRN Severe Pain (7 - 10)  ondansetron Injectable 4 milliGRAM(s) IV Push every 8 hours PRN Nausea and/or Vomiting      ALLERGIES:    No Known Allergies    Intolerances        FAMILY HISTORY:    Nothing noted      REVIEW OF SYSTEMS:    Constitutional, Eyes, ENT, Cardiovascular, Respiratory, Gastrointestinal, Genitourinary, Musculoskeletal, Integumentary, Neurological, Psychiatric, Endocrine, Heme/Lymph and Allergic/Immunologic review of systems are otherwise negative except as noted in HPI.       VITALS:    T(C): 37.5 (14 May 2024 09:37), Max: 37.5 (14 May 2024 09:37)  T(F): 99.5 (14 May 2024 09:37), Max: 99.5 (14 May 2024 09:37)  HR: 101 (14 May 2024 09:37) (98 - 103)  BP: 116/79 (14 May 2024 09:37) (115/70 - 128/72)  BP(mean): 84 (14 May 2024 06:00) (83 - 85)  RR: 19 (14 May 2024 09:37) (18 - 21)  SpO2: 96% (14 May 2024 09:37) (96% - 99%)    Parameters below as of 14 May 2024 09:37  Patient On (Oxygen Delivery Method): room air        PHYSICAL:    Constitutional:   Eyes: no conjunctival infection, anicteric  ENT: pharynx is unremarkable  Neck: supple without JVD  Pulmonary: clear to auscultation bilaterally  Cardiac: RRR   Vascular: no calf tenderness, venous stasis changes  Abdomen: normoactive bowel sounds, soft and nontender, no hepatosplenomegaly or masses appreciated.   Lymphatic: no peripheral adenopathy appreciated.   Musculoskeletal: full range of motion and no deformities appreciated.   Skin: normal appearance, no rash  Neurology:       LABS:    CBC Full  -  ( 14 May 2024 01:18 )  WBC Count : 7.15 K/uL  RBC Count : 4.61 M/uL  Hemoglobin : 13.1 g/dL  Hematocrit : 39.5 %  Platelet Count - Automated : 294 K/uL  Mean Cell Volume : 85.7 fl  Mean Cell Hemoglobin : 28.4 pg  Mean Cell Hemoglobin Concentration : 33.2 gm/dL  Auto Neutrophil # : 4.11 K/uL  Auto Lymphocyte # : 2.42 K/uL  Auto Monocyte # : 0.52 K/uL  Auto Eosinophil # : 0.06 K/uL  Auto Basophil # : 0.02 K/uL  Auto Neutrophil % : 57.5 %  Auto Lymphocyte % : 33.8 %  Auto Monocyte % : 7.3 %  Auto Eosinophil % : 0.8 %  Auto Basophil % : 0.3 %    05-14    135  |  106  |  21  ----------------------------<  97  3.7   |  25  |  0.63    Ca    9.1      14 May 2024 01:18    TPro  6.9  /  Alb  3.8  /  TBili  0.6  /  DBili  x   /  AST  29  /  ALT  24  /  AlkPhos  52  05-14    PT/INR - ( 14 May 2024 01:18 )   PT: 10.6 sec;   INR: 0.94 ratio         PTT - ( 14 May 2024 01:18 )  PTT:30.7 sec  Urinalysis Basic - ( 14 May 2024 01:18 )    Color: x / Appearance: x / SG: x / pH: x  Gluc: 97 mg/dL / Ketone: x  / Bili: x / Urobili: x   Blood: x / Protein: x / Nitrite: x   Leuk Esterase: x / RBC: x / WBC x   Sq Epi: x / Non Sq Epi: x / Bacteria: x        RADIOLOGY & ADDITIONAL STUDIES:      CT Neck Soft Tissue w/ IV Cont (05.14.24 @ 02:01)    IMPRESSION:    CT head: No acute intracranial hemorrhage, mass effect, or midline shift.   No abnormal intracranial contrast enhancement is identified. If not   contraindicated, a contrast-enhanced MRI would provide more sensitive   evaluation for intracranial metastatic disease.    CT Neck: Partially visualized superior mediastinal and left   supraclavicular adenopathy, as above with associated mild mass effect,   new/increased comparedwith 11/8/2023.        CT Angio Chest PE Protocol w/ IV Cont (05.14.24 @ 02:03)    FINDINGS: Motion artifact degrades image quality and limits evaluation.  CHEST:  LUNGS AND LARGE AIRWAYS: Patent central airways. Atelectatic changes   involving both lower lobes. A few subcentimetercalcified granulomatous   are seen scattered throughout both lungs.  PLEURA: No pleural effusion.  VESSELS: Within normal limits. No evidence of PE.  HEART: Heart is of upper limits of normal. No pericardial effusion.  MEDIASTINUM AND JONEL: Mediastinal lymphadenopathy, largest of which   measures up to 3.4 cm in short axis interposed between the left common   carotid and subclavian arteries, likely representing metastatic disease,   interval worsening. Mild bilateral hilar lymphadenopathy also noted.  CHEST WALL AND LOWER NECK: Within normal limits.    ABDOMEN AND PELVIS:  LIVER: Within normal limits.  BILE DUCTS: Normal caliber.  GALLBLADDER: Within normal limits.  SPLEEN: Within normal limits.  PANCREAS: Within normal limits.  ADRENALS: Within normal limits.  KIDNEYS/URETERS: Within normal limits.    BLADDER: Within normal limits.  REPRODUCTIVE ORGANS: A 2 cm right ovarian hemorrhagic cyst is noted. IUD   seen within the endometrial cavity.    BOWEL: No bowel obstruction. Appendix is normal.  PERITONEUM: Trace pelvic fluid, likely physiologic.  VESSELS: Within normal limits.  RETROPERITONEUM/LYMPH NODES: Retroperitoneal and left external iliac   lymphadenopathy, some which appear partially necrotic suggesting   metastatic disease, measuring up to 2.5 cm in short axis, not seen on   prior study. Shotty subcentimeter soft tissue density masses are noted in   the left retroperitoneum, likely representing metastatic disease, not   seen on prior study.  ABDOMINAL WALL: Tiny bilateralfat-containing indirect inguinal hernias.  BONES: Within normal limits.    IMPRESSION:    1. No evidence of PE.    2. Metastatic disease, as above. Nonemergent PET/CT recommended for   further evaluation.        CT Knee w/ IV Cont, Left (05.14.24 @ 11:05)    IMPRESSION:  1.  No definite mass lesion or nodular/masslike enhancement seen on CT.   For better assessment of possible residual/recurrent disease, recommend   dedicated knee MRI with and without contrast.  2.  Posttreatment changes of the patella, distal femur, and proximal   tibia.  3.  Mild prepatellar bursitis.     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  Patient noted she hasn't been feeling well for the last few months, having pain in her L neck as well as L groin. She stated that she just recently completed her RT with [facility] Dr Handy this past 02/2024.  She admitted that she lost her insurance and hasn't been able to follow up since then. She understands her suspected dx at this time but noted that when she was first dx, it was all localized in her L knee. She is upset, but denied any other acute c/o at this time.      05/14/24: Patient seen at bedside;  #[id number], no acute distress, still having neck and chest/abdomen discomfort, understanding of suspected dx, but overall does not seem to have good support; lives in house with boyfriend in [facility], no insurance because of issues with migration papers      PAST MEDICAL & SURGICAL HISTORY:    Hyperlipidemia    Mass of left knee    Obese    Mass in neck    Presence of IUD    S/P knee surgery        MEDICATIONS  (STANDING):    enoxaparin Injectable 40 milliGRAM(s) SubCutaneous every 24 hours      MEDICATIONS  (PRN):    acetaminophen     Tablet .. 650 milliGRAM(s) Oral every 6 hours PRN Temp greater or equal to 38C (100.4F), Mild Pain (1 - 3)  aluminum hydroxide/magnesium hydroxide/simethicone Suspension 30 milliLiter(s) Oral every 4 hours PRN Dyspepsia  melatonin 3 milliGRAM(s) Oral at bedtime PRN Insomnia  morphine  - Injectable 2 milliGRAM(s) IV Push every 4 hours PRN Moderate Pain (4 - 6)  morphine  - Injectable 4 milliGRAM(s) IV Push every 4 hours PRN Severe Pain (7 - 10)  ondansetron Injectable 4 milliGRAM(s) IV Push every 8 hours PRN Nausea and/or Vomiting      ALLERGIES:    No Known Allergies    Intolerances        FAMILY HISTORY:    Nothing noted      REVIEW OF SYSTEMS:    Constitutional, Eyes, ENT, Cardiovascular, Respiratory, Gastrointestinal, Genitourinary, Musculoskeletal, Integumentary, Neurological, Psychiatric, Endocrine, Heme/Lymph and Allergic/Immunologic review of systems are otherwise negative except as noted in HPI.       VITALS:    T(C): 37.5 (14 May 2024 09:37), Max: 37.5 (14 May 2024 09:37)  T(F): 99.5 (14 May 2024 09:37), Max: 99.5 (14 May 2024 09:37)  HR: 101 (14 May 2024 09:37) (98 - 103)  BP: 116/79 (14 May 2024 09:37) (115/70 - 128/72)  BP(mean): 84 (14 May 2024 06:00) (83 - 85)  RR: 19 (14 May 2024 09:37) (18 - 21)  SpO2: 96% (14 May 2024 09:37) (96% - 99%)    Parameters below as of 14 May 2024 09:37  Patient On (Oxygen Delivery Method): room air        PHYSICAL:    Constitutional: no acute distress  Eyes: no conjunctival infection, anicteric  ENT: pharynx is unremarkable  Neck: supple without JVD  Pulmonary: clear to auscultation bilaterally  Cardiac: RRR   Vascular: no calf tenderness, venous stasis changes  Abdomen: normoactive bowel sounds, soft and nontender, no hepatosplenomegaly or masses appreciated.   Lymphatic: no peripheral adenopathy appreciated.   Musculoskeletal: full range of motion and no deformities appreciated.   Skin: normal appearance, no rash  Neurology: awake, alert, oriented       LABS:    CBC Full  -  ( 14 May 2024 01:18 )  WBC Count : 7.15 K/uL  RBC Count : 4.61 M/uL  Hemoglobin : 13.1 g/dL  Hematocrit : 39.5 %  Platelet Count - Automated : 294 K/uL  Mean Cell Volume : 85.7 fl  Mean Cell Hemoglobin : 28.4 pg  Mean Cell Hemoglobin Concentration : 33.2 gm/dL  Auto Neutrophil # : 4.11 K/uL  Auto Lymphocyte # : 2.42 K/uL  Auto Monocyte # : 0.52 K/uL  Auto Eosinophil # : 0.06 K/uL  Auto Basophil # : 0.02 K/uL  Auto Neutrophil % : 57.5 %  Auto Lymphocyte % : 33.8 %  Auto Monocyte % : 7.3 %  Auto Eosinophil % : 0.8 %  Auto Basophil % : 0.3 %    05-14    135  |  106  |  21  ----------------------------<  97  3.7   |  25  |  0.63    Ca    9.1      14 May 2024 01:18    TPro  6.9  /  Alb  3.8  /  TBili  0.6  /  DBili  x   /  AST  29  /  ALT  24  /  AlkPhos  52  05-14    PT/INR - ( 14 May 2024 01:18 )   PT: 10.6 sec;   INR: 0.94 ratio         PTT - ( 14 May 2024 01:18 )  PTT:30.7 sec  Urinalysis Basic - ( 14 May 2024 01:18 )    Color: x / Appearance: x / SG: x / pH: x  Gluc: 97 mg/dL / Ketone: x  / Bili: x / Urobili: x   Blood: x / Protein: x / Nitrite: x   Leuk Esterase: x / RBC: x / WBC x   Sq Epi: x / Non Sq Epi: x / Bacteria: x        RADIOLOGY & ADDITIONAL STUDIES:      CT Neck Soft Tissue w/ IV Cont (05.14.24 @ 02:01)    IMPRESSION:    CT head: No acute intracranial hemorrhage, mass effect, or midline shift.   No abnormal intracranial contrast enhancement is identified. If not   contraindicated, a contrast-enhanced MRI would provide more sensitive   evaluation for intracranial metastatic disease.    CT Neck: Partially visualized superior mediastinal and left   supraclavicular adenopathy, as above with associated mild mass effect,   new/increased comparedwith 11/8/2023.        CT Angio Chest PE Protocol w/ IV Cont (05.14.24 @ 02:03)    FINDINGS: Motion artifact degrades image quality and limits evaluation.  CHEST:  LUNGS AND LARGE AIRWAYS: Patent central airways. Atelectatic changes   involving both lower lobes. A few subcentimetercalcified granulomatous   are seen scattered throughout both lungs.  PLEURA: No pleural effusion.  VESSELS: Within normal limits. No evidence of PE.  HEART: Heart is of upper limits of normal. No pericardial effusion.  MEDIASTINUM AND JONEL: Mediastinal lymphadenopathy, largest of which   measures up to 3.4 cm in short axis interposed between the left common   carotid and subclavian arteries, likely representing metastatic disease,   interval worsening. Mild bilateral hilar lymphadenopathy also noted.  CHEST WALL AND LOWER NECK: Within normal limits.    ABDOMEN AND PELVIS:  LIVER: Within normal limits.  BILE DUCTS: Normal caliber.  GALLBLADDER: Within normal limits.  SPLEEN: Within normal limits.  PANCREAS: Within normal limits.  ADRENALS: Within normal limits.  KIDNEYS/URETERS: Within normal limits.    BLADDER: Within normal limits.  REPRODUCTIVE ORGANS: A 2 cm right ovarian hemorrhagic cyst is noted. IUD   seen within the endometrial cavity.    BOWEL: No bowel obstruction. Appendix is normal.  PERITONEUM: Trace pelvic fluid, likely physiologic.  VESSELS: Within normal limits.  RETROPERITONEUM/LYMPH NODES: Retroperitoneal and left external iliac   lymphadenopathy, some which appear partially necrotic suggesting   metastatic disease, measuring up to 2.5 cm in short axis, not seen on   prior study. Shotty subcentimeter soft tissue density masses are noted in   the left retroperitoneum, likely representing metastatic disease, not   seen on prior study.  ABDOMINAL WALL: Tiny bilateralfat-containing indirect inguinal hernias.  BONES: Within normal limits.    IMPRESSION:    1. No evidence of PE.    2. Metastatic disease, as above. Nonemergent PET/CT recommended for   further evaluation.        CT Knee w/ IV Cont, Left (05.14.24 @ 11:05)    IMPRESSION:  1.  No definite mass lesion or nodular/masslike enhancement seen on CT.   For better assessment of possible residual/recurrent disease, recommend   dedicated knee MRI with and without contrast.  2.  Posttreatment changes of the patella, distal femur, and proximal   tibia.  3.  Mild prepatellar bursitis.

## 2024-05-15 ENCOUNTER — TRANSCRIPTION ENCOUNTER (OUTPATIENT)
Age: 29
End: 2024-05-15

## 2024-05-15 VITALS
DIASTOLIC BLOOD PRESSURE: 64 MMHG | RESPIRATION RATE: 18 BRPM | SYSTOLIC BLOOD PRESSURE: 113 MMHG | HEART RATE: 88 BPM | OXYGEN SATURATION: 99 % | TEMPERATURE: 99 F

## 2024-05-15 PROBLEM — R22.1 LOCALIZED SWELLING, MASS AND LUMP, NECK: Chronic | Status: ACTIVE | Noted: 2024-05-14

## 2024-05-15 LAB
ANION GAP SERPL CALC-SCNC: 6 MMOL/L — SIGNIFICANT CHANGE UP (ref 5–17)
BUN SERPL-MCNC: 20 MG/DL — SIGNIFICANT CHANGE UP (ref 7–23)
CALCIUM SERPL-MCNC: 9.4 MG/DL — SIGNIFICANT CHANGE UP (ref 8.5–10.1)
CHLORIDE SERPL-SCNC: 106 MMOL/L — SIGNIFICANT CHANGE UP (ref 96–108)
CO2 SERPL-SCNC: 24 MMOL/L — SIGNIFICANT CHANGE UP (ref 22–31)
CREAT SERPL-MCNC: 0.62 MG/DL — SIGNIFICANT CHANGE UP (ref 0.5–1.3)
EGFR: 124 ML/MIN/1.73M2 — SIGNIFICANT CHANGE UP
GLUCOSE SERPL-MCNC: 98 MG/DL — SIGNIFICANT CHANGE UP (ref 70–99)
HCT VFR BLD CALC: 41.3 % — SIGNIFICANT CHANGE UP (ref 34.5–45)
HGB BLD-MCNC: 13.6 G/DL — SIGNIFICANT CHANGE UP (ref 11.5–15.5)
MCHC RBC-ENTMCNC: 28.4 PG — SIGNIFICANT CHANGE UP (ref 27–34)
MCHC RBC-ENTMCNC: 32.9 GM/DL — SIGNIFICANT CHANGE UP (ref 32–36)
MCV RBC AUTO: 86.2 FL — SIGNIFICANT CHANGE UP (ref 80–100)
PLATELET # BLD AUTO: 342 K/UL — SIGNIFICANT CHANGE UP (ref 150–400)
POTASSIUM SERPL-MCNC: 4.1 MMOL/L — SIGNIFICANT CHANGE UP (ref 3.5–5.3)
POTASSIUM SERPL-SCNC: 4.1 MMOL/L — SIGNIFICANT CHANGE UP (ref 3.5–5.3)
RBC # BLD: 4.79 M/UL — SIGNIFICANT CHANGE UP (ref 3.8–5.2)
RBC # FLD: 12.4 % — SIGNIFICANT CHANGE UP (ref 10.3–14.5)
SODIUM SERPL-SCNC: 136 MMOL/L — SIGNIFICANT CHANGE UP (ref 135–145)
WBC # BLD: 6.69 K/UL — SIGNIFICANT CHANGE UP (ref 3.8–10.5)
WBC # FLD AUTO: 6.69 K/UL — SIGNIFICANT CHANGE UP (ref 3.8–10.5)

## 2024-05-15 PROCEDURE — 99232 SBSQ HOSP IP/OBS MODERATE 35: CPT

## 2024-05-15 PROCEDURE — 99239 HOSP IP/OBS DSCHRG MGMT >30: CPT

## 2024-05-15 RX ADMIN — Medication 650 MILLIGRAM(S): at 09:27

## 2024-05-15 NOTE — DISCHARGE NOTE PROVIDER - NSDCFUSCHEDAPPT_GEN_ALL_CORE_FT
Gena Lino  [facility]  [facility]  Scheduled Appointment: 05/22/2024    Avel Handy  [facility]  [address]  Scheduled Appointment: 06/13/2024

## 2024-05-15 NOTE — DISCHARGE NOTE PROVIDER - NSDCFUADDAPPT_GEN_ALL_CORE_FT
sis de segumiento:  [facility]  [address] [place] [address]  [phone]  ssi con CIERRA Bower, 5/28/24 a las 2:40pm

## 2024-05-15 NOTE — DISCHARGE NOTE PROVIDER - PROVIDER TOKENS
FREE:[LAST:[Primary Medical Provider],PHONE:[(   )    -],FAX:[(   )    -],FOLLOWUP:[1 week],ESTABLISHEDPATIENT:[T]]

## 2024-05-15 NOTE — PROGRESS NOTE ADULT - ASSESSMENT
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 Mild insertion distally towards the superior aspect of the patella along the prepatellar bursa.  - L knee bx 10/19/23 revealed malignant neoplasm with melanocytic differentiation; clear cell is favored  - 11/08/23 PET/CT completed ---> left anterior knee mass; tiny less than 5 mm left-sided popliteal focus; nonspecific small L hepatic lobe segment 3 focus with no clear CT correlate is noted; multiple bilateral small subcentimeter lung nodules are noted most of which represent calcified granulomas as well as FDG avid mediastinal and hilar lymph nodes which may also represent granulomatous disease  - 11/10/23; S/p radical resection of the L knee sarcoma including resection of periosteum of the patella as well as superficial portion of the quad tendon (Tumor involved adipose tissue and fascia; mitotic rate is 3/10 HPFs; necrosis is present (<5%); deep margins positive  - CT imaging this admission revealed mediastinal ELISE, largest up to 3.4 cm in short axis interposed between the left common; RP and L external iliac ELISE, some which appear partially necrotic up to 2.5cm   - S/p LN bx with IR 05/14 pending final path  - At this time, patient needs close outpatient follow up with Med Onc to discuss bx findings and likely systemic therapy options ---> anticipated outpatient follow up with Dr Hogue 05/22  - Continue supportive measures as necessary         Minesh Lowery PA-C  Hematology / Oncology  [facility] [place]  [phone]

## 2024-05-15 NOTE — DISCHARGE NOTE PROVIDER - HOSPITAL COURSE
FROM H&P:    "30 y/o female with PMHx of clear cell sarcoma left knee s/p excision and tendon reconstruction and radiation therapy (completed in FEB 2024) who presents to  with CC of left neck pain/ chest pain.  Patient notes she hasn't been feeling well for the last few months.  She notes she has been having pain in her left neck as well as pain in her left groin.  Sx ongoing for close to 1 month.  Getting worse.  She last saw her rad onc doctor in FEB when she completed her radiation treatments.  She lost her insurance and hasn't been able to follow up since then.  She denies hx of metastatic disease.  When she was dx, it was all localized in her left knee.  She had excisional surgery and then radiation.  She still has pain and swelling in the left knee but overall better since surgery.  Swelling has persisted.  She was never told about any distant disease.  In the ER, patient had CT of the head/ neck/ chest/ abd/ pelvis.  This showed large left supraclavicular lymph node  up to 2.7 in diameter as well as left external iliac/ retroperitoneal masses/ ELISE.  "    Admitted. Labs unremarkable. Hem/Onc consult->consulted IR->s/p Supraclavicular LN biopsy. Cleared by Hem/Onc. hemodynamically stable    Generalized Lymphadenopathy suspected to be Progression of underlying clear cell sarcoma s/p Supraclavicular node biopsy.   Neoplasm related pain    T(C): 37.1 (05-15-24 @ 16:02), Max: 37.1 (05-15-24 @ 16:02)  HR: 88 (05-15-24 @ 16:02) (71 - 104)  BP: 113/64 (05-15-24 @ 16:02) (98/63 - 114/69)  RR: 18 (05-15-24 @ 16:02) (18 - 18)  SpO2: 99% (05-15-24 @ 16:02) (97% - 99%)  AAOx3; Obese  No JVD  RRR;   Normal respiratory effort  Abdomen benign.   Discharge Management: 36 minutes  Date of Discharge/Service: 5/15/2024

## 2024-05-15 NOTE — DISCHARGE NOTE NURSING/CASE MANAGEMENT/SOCIAL WORK - PATIENT PORTAL LINK FT
You can access the FollowMyHealth Patient Portal offered by [facility] by registering at the following website: [email or web address]. By joining [facility]’s FollowMyHealth portal, you will also be able to view your health information using other applications (apps) compatible with our system.

## 2024-05-15 NOTE — PROGRESS NOTE ADULT - NS ATTEND AMEND GEN_ALL_CORE FT
Patient evaluated. Current imaging reviewed. Outpatient oncology records reviewed.  30 y/o female  with clear cell sarcoma of left knee. PET Nov 2023- no overt metastatic disease. 11/10/23  s/p radical resection of  left knee tumor ( Dr Ham Delgado)  Path clear cell sarcoma  5.1 cm  mitotic rate 3/10 necrosis < 5 % , involved deep margins pT2 Nx S/p adjuvant RT completed Feb 2024 ( Dr AMANDO Handy). Now suspicion for metastatic disease-   left supraclav, mediastinal, RP and iliac adenopathy.  Plan :  IR biopsy of L supraclav adenopathy.  If metastatic disease confirmed - she will need  systemic therapy- anthracycline based chemotherapy, also will need [employer] to evaluate if actionable mutations. Patient seen on morning rounds. S/p IR biopsy of L supraclav ELISE  yesterday.   Plan for outpatient follow up next week for biopsy results.   If metastatic disease confirmed - she will need  systemic therapy- anthracycline based chemotherapy, also will need [employer] to evaluate if actionable mutations.

## 2024-05-15 NOTE — PROGRESS NOTE ADULT - SUBJECTIVE AND OBJECTIVE BOX
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  Patient noted she hasn't been feeling well for the last few months, having pain in her L neck as well as L groin. She stated that she just recently completed her RT with [facility] Dr Handy this past 02/2024.  She admitted that she lost her insurance and hasn't been able to follow up since then. She understands her suspected dx at this time but noted that when she was first dx, it was all localized in her L knee. She is upset, but denied any other acute c/o at this time.      05/14/24: Patient seen at bedside;  #[id number], no acute distress, still having neck and chest/abdomen discomfort, understanding of suspected dx, but overall does not seem to have good support; lives in house with boyfriend in [facility], no insurance because of issues with migration papers    05/15/24: Patient seen at bedside with Dr Hogue;  #[id number], no acute distress, feeling well, understanding of suspected dx, agreeable to follow up with Dr Hogue in outpatient setting      PAST MEDICAL & SURGICAL HISTORY:    Hyperlipidemia    Mass of left knee    Obese    Mass in neck    Presence of IUD    S/P knee surgery        MEDICATIONS  (STANDING):    enoxaparin Injectable 40 milliGRAM(s) SubCutaneous every 24 hours      MEDICATIONS  (PRN):    acetaminophen     Tablet .. 650 milliGRAM(s) Oral every 6 hours PRN Temp greater or equal to 38C (100.4F), Mild Pain (1 - 3)  aluminum hydroxide/magnesium hydroxide/simethicone Suspension 30 milliLiter(s) Oral every 4 hours PRN Dyspepsia  melatonin 3 milliGRAM(s) Oral at bedtime PRN Insomnia  morphine  - Injectable 2 milliGRAM(s) IV Push every 4 hours PRN Moderate Pain (4 - 6)  morphine  - Injectable 4 milliGRAM(s) IV Push every 4 hours PRN Severe Pain (7 - 10)  ondansetron Injectable 4 milliGRAM(s) IV Push every 8 hours PRN Nausea and/or Vomiting        ALLERGIES:    No Known Allergies    Intolerances        FAMILY HISTORY:    Nothing noted      REVIEW OF SYSTEMS:    Constitutional, Eyes, ENT, Cardiovascular, Respiratory, Gastrointestinal, Genitourinary, Musculoskeletal, Integumentary, Neurological, Psychiatric, Endocrine, Heme/Lymph and Allergic/Immunologic review of systems are otherwise negative except as noted in HPI.       VITALS:    ICU Vital Signs Last 24 Hrs  T(C): 36.9 (15 May 2024 08:13), Max: 37.2 (14 May 2024 15:48)  T(F): 98.4 (15 May 2024 08:13), Max: 98.9 (14 May 2024 15:48)  HR: 71 (15 May 2024 08:13) (71 - 104)  BP: 98/635 (15 May 2024 08:13) (98/635 - 115/66)  BP(mean): --  ABP: --  ABP(mean): --  RR: 18 (15 May 2024 08:13) (18 - 18)  SpO2: 97% (15 May 2024 08:13) (97% - 99%)    O2 Parameters below as of 15 May 2024 08:13  Patient On (Oxygen Delivery Method): room air        PHYSICAL:    Constitutional: no acute distress   Eyes: no conjunctival infection, anicteric  ENT: pharynx is unremarkable  Neck: supple without JVD  Pulmonary: clear to auscultation bilaterally  Cardiac: RRR   Vascular: no calf tenderness, venous stasis changes  Abdomen: normoactive bowel sounds, soft and nontender, no hepatosplenomegaly or masses appreciated.   Lymphatic: no peripheral adenopathy appreciated.   Musculoskeletal: full range of motion and no deformities appreciated.   Skin: normal appearance, no rash  Neurology: awake, alert, no acute distress       LABS:                          13.6   6.69  )-----------( 342      ( 15 May 2024 07:20 )             41.3     05-15    136  |  106  |  20  ----------------------------<  98  4.1   |  24  |  0.62    Ca    9.4      15 May 2024 07:20    TPro  6.9  /  Alb  3.8  /  TBili  0.6  /  DBili  x   /  AST  29  /  ALT  24  /  AlkPhos  52  05-14        RADIOLOGY & ADDITIONAL STUDIES:      CT Neck Soft Tissue w/ IV Cont (05.14.24 @ 02:01)    IMPRESSION:    CT head: No acute intracranial hemorrhage, mass effect, or midline shift.   No abnormal intracranial contrast enhancement is identified. If not   contraindicated, a contrast-enhanced MRI would provide more sensitive   evaluation for intracranial metastatic disease.    CT Neck: Partially visualized superior mediastinal and left   supraclavicular adenopathy, as above with associated mild mass effect,   new/increased comparedwith 11/8/2023.        CT Angio Chest PE Protocol w/ IV Cont (05.14.24 @ 02:03)    FINDINGS: Motion artifact degrades image quality and limits evaluation.  CHEST:  LUNGS AND LARGE AIRWAYS: Patent central airways. Atelectatic changes   involving both lower lobes. A few subcentimetercalcified granulomatous   are seen scattered throughout both lungs.  PLEURA: No pleural effusion.  VESSELS: Within normal limits. No evidence of PE.  HEART: Heart is of upper limits of normal. No pericardial effusion.  MEDIASTINUM AND JONEL: Mediastinal lymphadenopathy, largest of which   measures up to 3.4 cm in short axis interposed between the left common   carotid and subclavian arteries, likely representing metastatic disease,   interval worsening. Mild bilateral hilar lymphadenopathy also noted.  CHEST WALL AND LOWER NECK: Within normal limits.    ABDOMEN AND PELVIS:  LIVER: Within normal limits.  BILE DUCTS: Normal caliber.  GALLBLADDER: Within normal limits.  SPLEEN: Within normal limits.  PANCREAS: Within normal limits.  ADRENALS: Within normal limits.  KIDNEYS/URETERS: Within normal limits.    BLADDER: Within normal limits.  REPRODUCTIVE ORGANS: A 2 cm right ovarian hemorrhagic cyst is noted. IUD   seen within the endometrial cavity.    BOWEL: No bowel obstruction. Appendix is normal.  PERITONEUM: Trace pelvic fluid, likely physiologic.  VESSELS: Within normal limits.  RETROPERITONEUM/LYMPH NODES: Retroperitoneal and left external iliac   lymphadenopathy, some which appear partially necrotic suggesting   metastatic disease, measuring up to 2.5 cm in short axis, not seen on   prior study. Shotty subcentimeter soft tissue density masses are noted in   the left retroperitoneum, likely representing metastatic disease, not   seen on prior study.  ABDOMINAL WALL: Tiny bilateral fat-containing indirect inguinal hernias.  BONES: Within normal limits.    IMPRESSION:    1. No evidence of PE.    2. Metastatic disease, as above. Nonemergent PET/CT recommended for   further evaluation.        CT Knee w/ IV Cont, Left (05.14.24 @ 11:05)    IMPRESSION:  1.  No definite mass lesion or nodular/masslike enhancement seen on CT.   For better assessment of possible residual/recurrent disease, recommend   dedicated knee MRI with and without contrast.  2.  Posttreatment changes of the patella, distal femur, and proximal   tibia.  3.  Mild prepatellar bursitis.

## 2024-05-15 NOTE — DISCHARGE NOTE PROVIDER - CARE PROVIDER_API CALL
Primary Medical Provider,   Phone: (   )    -  Fax: (   )    -  Established Patient  Follow Up Time: 1 week

## 2024-05-15 NOTE — DISCHARGE NOTE NURSING/CASE MANAGEMENT/SOCIAL WORK - NSDCFUADDAPPT_GEN_ALL_CORE_FT
sis de segumiento:  [facility]  [address] [place] [address]  [phone]  sis con CIERRA Bowre, 5/28/24 a las 2:40pm

## 2024-05-15 NOTE — DISCHARGE NOTE PROVIDER - NSDCCPCAREPLAN_GEN_ALL_CORE_FT
PRINCIPAL DISCHARGE DIAGNOSIS  Diagnosis: Metastatic cancer  Assessment and Plan of Treatment: had biopsy. Follow up with hematologist/oncologist to review biopsy results in 1 week. Take OTC tylenol as written on bottle for pain control.

## 2024-05-17 LAB — SURGICAL PATHOLOGY STUDY: SIGNIFICANT CHANGE UP

## 2024-05-21 ENCOUNTER — OUTPATIENT (OUTPATIENT)
Dept: OUTPATIENT SERVICES | Facility: HOSPITAL | Age: 29
LOS: 1 days | Discharge: HOME | End: 2024-05-21

## 2024-05-21 DIAGNOSIS — Z98.890 OTHER SPECIFIED POSTPROCEDURAL STATES: Chronic | ICD-10-CM

## 2024-05-21 DIAGNOSIS — Z97.5 PRESENCE OF (INTRAUTERINE) CONTRACEPTIVE DEVICE: Chronic | ICD-10-CM

## 2024-05-21 DIAGNOSIS — R79.9 ABNORMAL FINDING OF BLOOD CHEMISTRY, UNSPECIFIED: ICD-10-CM

## 2024-05-22 ENCOUNTER — NON-APPOINTMENT (OUTPATIENT)
Age: 29
End: 2024-05-22

## 2024-05-22 ENCOUNTER — APPOINTMENT (OUTPATIENT)
Dept: HEMATOLOGY ONCOLOGY | Facility: CLINIC | Age: 29
End: 2024-05-22
Payer: COMMERCIAL

## 2024-05-22 VITALS
HEART RATE: 82 BPM | DIASTOLIC BLOOD PRESSURE: 83 MMHG | SYSTOLIC BLOOD PRESSURE: 122 MMHG | TEMPERATURE: 98.7 F | HEIGHT: 62 IN | BODY MASS INDEX: 38.84 KG/M2 | RESPIRATION RATE: 18 BRPM | OXYGEN SATURATION: 99 % | WEIGHT: 211.06 LBS

## 2024-05-22 DIAGNOSIS — C49.9 MALIGNANT NEOPLASM OF CONNECTIVE AND SOFT TISSUE, UNSPECIFIED: ICD-10-CM

## 2024-05-22 PROCEDURE — 99215 OFFICE O/P EST HI 40 MIN: CPT

## 2024-05-22 NOTE — PHYSICAL EXAM
[Fully active, able to carry on all pre-disease performance without restriction] : Status 0 - Fully active, able to carry on all pre-disease performance without restriction [de-identified] : young  female , looks well

## 2024-05-22 NOTE — ASSESSMENT
[FreeTextEntry1] : 30 y/o  female s/p radical resection of clear cell sarcoma of the left knee November 2023. may 2024 - diagnosed with biopsy proven metastatic disease- metastatic adenopathy- supraclav, mediastinal, RP, iliac. Minimally symptomatic Good performance status. Discussed diagnosis  and palliative intent of treatment.  Rare type of sarcoma with limited sensitivity to athracyclines. Will send [employer] to evaluate if actionable therapeutic targets. Will contact Dr Lavon Jones - sarcoma oncologist for advice re best systemic therapy options.  Will need genetic testing at some point ( will discuss on futire appointments). Referred to .  return visit next week.

## 2024-05-22 NOTE — HISTORY OF PRESENT ILLNESS
[de-identified] : [place]ish speaking patient- used [employer]  [facility] [id number]  28 y/o  female with hx of clear cell sarcoma of left knee. PET Nov 2023  no overt mets.  11/10/23 radical resection of L knee tumor ( Dr Ham Delgado) Path : clear cell sarcoma 5.1 cm  mitotic rate 3/10 necrosis < 5%, positive deep margins  p T2 , Nx  Adjuvant RT completed Feb 2024 ( Dr Handy)  May 2024- admitted to  with left neck swelling. Scans showed metastatic disease : L supraclav, mediastinal, RP, iliac adenopathy  5/14/24  IR biopsy  of L supraclav LN : metastatic clear cell sarcoma c/w known primary.  Feels OK. Sporadic discomfort in left lower abdomen- takes Tylenol.   No family  locally. Lives with her boyfriend. 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

## 2024-05-23 DIAGNOSIS — C77.2 SECONDARY AND UNSPECIFIED MALIGNANT NEOPLASM OF INTRA-ABDOMINAL LYMPH NODES: ICD-10-CM

## 2024-05-23 DIAGNOSIS — C77.5 SECONDARY AND UNSPECIFIED MALIGNANT NEOPLASM OF INTRAPELVIC LYMPH NODES: ICD-10-CM

## 2024-05-23 DIAGNOSIS — R07.9 CHEST PAIN, UNSPECIFIED: ICD-10-CM

## 2024-05-23 DIAGNOSIS — C77.0 SECONDARY AND UNSPECIFIED MALIGNANT NEOPLASM OF LYMPH NODES OF HEAD, FACE AND NECK: ICD-10-CM

## 2024-05-23 DIAGNOSIS — C49.22 MALIGNANT NEOPLASM OF CONNECTIVE AND SOFT TISSUE OF LEFT LOWER LIMB, INCLUDING HIP: ICD-10-CM

## 2024-05-23 DIAGNOSIS — M54.2 CERVICALGIA: ICD-10-CM

## 2024-05-23 DIAGNOSIS — Z59.819 HOUSING INSTABILITY, HOUSED UNSPECIFIED: ICD-10-CM

## 2024-05-23 DIAGNOSIS — C49.9 MALIGNANT NEOPLASM OF CONNECTIVE AND SOFT TISSUE, UNSPECIFIED: ICD-10-CM

## 2024-05-23 DIAGNOSIS — Z59.41 FOOD INSECURITY: ICD-10-CM

## 2024-05-23 DIAGNOSIS — Z97.5 PRESENCE OF (INTRAUTERINE) CONTRACEPTIVE DEVICE: ICD-10-CM

## 2024-05-23 DIAGNOSIS — Z91.190 PATIENT'S NONCOMPLIANCE WITH OTHER MEDICAL TREATMENT AND REGIMEN DUE TO FINANCIAL HARDSHIP: ICD-10-CM

## 2024-05-23 DIAGNOSIS — G89.3 NEOPLASM RELATED PAIN (ACUTE) (CHRONIC): ICD-10-CM

## 2024-05-23 DIAGNOSIS — Z59.87 MATERIAL HARDSHIP DUE TO LIMITED FINANCIAL RESOURCES, NOT ELSEWHERE CLASSIFIED: ICD-10-CM

## 2024-05-23 DIAGNOSIS — Z92.3 PERSONAL HISTORY OF IRRADIATION: ICD-10-CM

## 2024-05-23 SDOH — ECONOMIC STABILITY - FOOD INSECURITY: FOOD INSECURITY: Z59.41

## 2024-05-23 SDOH — ECONOMIC STABILITY - HOUSING INSECURITY: HOUSING INSTABILITY UNSPECIFIED: Z59.819

## 2024-05-23 SDOH — ECONOMIC STABILITY - INCOME SECURITY: MATERIAL HARDSHIP DUE TO LIMITED FINANCIAL RESOURCES, NOT ELSEWHERE CLASSIFIED: Z59.87

## 2024-05-29 ENCOUNTER — NON-APPOINTMENT (OUTPATIENT)
Age: 29
End: 2024-05-29

## 2024-06-04 ENCOUNTER — RESULT REVIEW (OUTPATIENT)
Age: 29
End: 2024-06-04

## 2024-06-04 ENCOUNTER — APPOINTMENT (OUTPATIENT)
Dept: HEMATOLOGY ONCOLOGY | Facility: CLINIC | Age: 29
End: 2024-06-04

## 2024-06-04 ENCOUNTER — NON-APPOINTMENT (OUTPATIENT)
Age: 29
End: 2024-06-04

## 2024-06-04 VITALS — DIASTOLIC BLOOD PRESSURE: 76 MMHG | SYSTOLIC BLOOD PRESSURE: 115 MMHG

## 2024-06-04 LAB
BASOPHILS # BLD AUTO: 0.03 K/UL — SIGNIFICANT CHANGE UP (ref 0–0.2)
BASOPHILS NFR BLD AUTO: 0.4 % — SIGNIFICANT CHANGE UP (ref 0–2)
EOSINOPHIL # BLD AUTO: 0.09 K/UL — SIGNIFICANT CHANGE UP (ref 0–0.5)
EOSINOPHIL NFR BLD AUTO: 1.3 % — SIGNIFICANT CHANGE UP (ref 0–6)
HCT VFR BLD CALC: 42.2 % — SIGNIFICANT CHANGE UP (ref 34.5–45)
HGB BLD-MCNC: 13.8 G/DL — SIGNIFICANT CHANGE UP (ref 11.5–15.5)
IMM GRANULOCYTES NFR BLD AUTO: 0.3 % — SIGNIFICANT CHANGE UP (ref 0–0.9)
LYMPHOCYTES # BLD AUTO: 2.16 K/UL — SIGNIFICANT CHANGE UP (ref 1–3.3)
LYMPHOCYTES # BLD AUTO: 30 % — SIGNIFICANT CHANGE UP (ref 13–44)
MCHC RBC-ENTMCNC: 27.7 PG — SIGNIFICANT CHANGE UP (ref 27–34)
MCHC RBC-ENTMCNC: 32.7 GM/DL — SIGNIFICANT CHANGE UP (ref 32–36)
MCV RBC AUTO: 84.6 FL — SIGNIFICANT CHANGE UP (ref 80–100)
MONOCYTES # BLD AUTO: 0.43 K/UL — SIGNIFICANT CHANGE UP (ref 0–0.9)
MONOCYTES NFR BLD AUTO: 6 % — SIGNIFICANT CHANGE UP (ref 2–14)
NEUTROPHILS # BLD AUTO: 4.47 K/UL — SIGNIFICANT CHANGE UP (ref 1.8–7.4)
NEUTROPHILS NFR BLD AUTO: 62 % — SIGNIFICANT CHANGE UP (ref 43–77)
NRBC # BLD: 0 /100 WBCS — SIGNIFICANT CHANGE UP (ref 0–0)
PLATELET # BLD AUTO: 424 K/UL — HIGH (ref 150–400)
RBC # BLD: 4.99 M/UL — SIGNIFICANT CHANGE UP (ref 3.8–5.2)
RBC # FLD: 11.9 % — SIGNIFICANT CHANGE UP (ref 10.3–14.5)
WBC # BLD: 7.2 K/UL — SIGNIFICANT CHANGE UP (ref 3.8–10.5)
WBC # FLD AUTO: 7.2 K/UL — SIGNIFICANT CHANGE UP (ref 3.8–10.5)

## 2024-06-05 LAB
ALBUMIN SERPL ELPH-MCNC: 4.4 G/DL — SIGNIFICANT CHANGE UP (ref 3.3–5)
ALP SERPL-CCNC: 77 U/L — SIGNIFICANT CHANGE UP (ref 40–120)
ALT FLD-CCNC: 27 U/L — SIGNIFICANT CHANGE UP (ref 10–45)
ANION GAP SERPL CALC-SCNC: 13 MMOL/L — SIGNIFICANT CHANGE UP (ref 5–17)
AST SERPL-CCNC: 19 U/L — SIGNIFICANT CHANGE UP (ref 10–40)
BILIRUB SERPL-MCNC: 0.3 MG/DL — SIGNIFICANT CHANGE UP (ref 0.2–1.2)
BUN SERPL-MCNC: 17 MG/DL — SIGNIFICANT CHANGE UP (ref 7–23)
CALCIUM SERPL-MCNC: 9.9 MG/DL — SIGNIFICANT CHANGE UP (ref 8.4–10.5)
CHLORIDE SERPL-SCNC: 103 MMOL/L — SIGNIFICANT CHANGE UP (ref 96–108)
CO2 SERPL-SCNC: 24 MMOL/L — SIGNIFICANT CHANGE UP (ref 22–31)
CREAT SERPL-MCNC: 0.63 MG/DL — SIGNIFICANT CHANGE UP (ref 0.5–1.3)
EGFR: 124 ML/MIN/1.73M2 — SIGNIFICANT CHANGE UP
GLUCOSE SERPL-MCNC: 99 MG/DL — SIGNIFICANT CHANGE UP (ref 70–99)
HCG SERPL QL: NEGATIVE — SIGNIFICANT CHANGE UP
POTASSIUM SERPL-MCNC: 5.3 MMOL/L — SIGNIFICANT CHANGE UP (ref 3.5–5.3)
POTASSIUM SERPL-SCNC: 5.3 MMOL/L — SIGNIFICANT CHANGE UP (ref 3.5–5.3)
PROT SERPL-MCNC: 7 G/DL — SIGNIFICANT CHANGE UP (ref 6–8.3)
SODIUM SERPL-SCNC: 140 MMOL/L — SIGNIFICANT CHANGE UP (ref 135–145)
T4 FREE+ TSH PNL SERPL: 1.02 UIU/ML — SIGNIFICANT CHANGE UP (ref 0.27–4.2)

## 2024-06-06 LAB
HAV IGM SER-ACNC: SIGNIFICANT CHANGE UP
HBV CORE IGM SER-ACNC: SIGNIFICANT CHANGE UP
HBV SURFACE AG SER-ACNC: SIGNIFICANT CHANGE UP
HCV AB S/CO SERPL IA: 0.11 S/CO — SIGNIFICANT CHANGE UP (ref 0–0.99)
HCV AB SERPL-IMP: SIGNIFICANT CHANGE UP

## 2024-06-07 ENCOUNTER — NON-APPOINTMENT (OUTPATIENT)
Age: 29
End: 2024-06-07

## 2024-06-13 ENCOUNTER — APPOINTMENT (OUTPATIENT)
Dept: RADIATION ONCOLOGY | Facility: CLINIC | Age: 29
End: 2024-06-13

## 2024-06-20 NOTE — BRIEF OPERATIVE NOTE - DISPOSITION
PACU then home Render Risk Assessment In Note?: no Additional Notes: Recommend seeing podiatrist. Detail Level: Simple

## 2024-08-08 ENCOUNTER — OUTPATIENT (OUTPATIENT)
Dept: OUTPATIENT SERVICES | Facility: HOSPITAL | Age: 29
LOS: 1 days | Discharge: HOME | End: 2024-08-08

## 2024-08-08 DIAGNOSIS — R79.9 ABNORMAL FINDING OF BLOOD CHEMISTRY, UNSPECIFIED: ICD-10-CM

## 2024-08-08 DIAGNOSIS — Z98.890 OTHER SPECIFIED POSTPROCEDURAL STATES: Chronic | ICD-10-CM

## 2024-08-08 DIAGNOSIS — Z97.5 PRESENCE OF (INTRAUTERINE) CONTRACEPTIVE DEVICE: Chronic | ICD-10-CM

## 2024-08-14 ENCOUNTER — APPOINTMENT (OUTPATIENT)
Dept: HEMATOLOGY ONCOLOGY | Facility: CLINIC | Age: 29
End: 2024-08-14

## 2024-08-21 ENCOUNTER — NON-APPOINTMENT (OUTPATIENT)
Age: 29
End: 2024-08-21

## 2024-11-07 ENCOUNTER — EMERGENCY (EMERGENCY)
Facility: HOSPITAL | Age: 29
LOS: 0 days | Discharge: HOME | End: 2024-11-07
Attending: HOSPITALIST
Payer: MEDICAID

## 2024-11-07 VITALS
RESPIRATION RATE: 22 BRPM | DIASTOLIC BLOOD PRESSURE: 70 MMHG | SYSTOLIC BLOOD PRESSURE: 134 MMHG | HEART RATE: 79 BPM | OXYGEN SATURATION: 100 % | TEMPERATURE: 97 F

## 2024-11-07 VITALS
WEIGHT: 199.74 LBS | SYSTOLIC BLOOD PRESSURE: 123 MMHG | TEMPERATURE: 98 F | HEART RATE: 92 BPM | RESPIRATION RATE: 18 BRPM | OXYGEN SATURATION: 99 % | DIASTOLIC BLOOD PRESSURE: 75 MMHG

## 2024-11-07 DIAGNOSIS — Z98.890 OTHER SPECIFIED POSTPROCEDURAL STATES: Chronic | ICD-10-CM

## 2024-11-07 DIAGNOSIS — M79.18 MYALGIA, OTHER SITE: ICD-10-CM

## 2024-11-07 DIAGNOSIS — G89.29 OTHER CHRONIC PAIN: ICD-10-CM

## 2024-11-07 DIAGNOSIS — Z97.5 PRESENCE OF (INTRAUTERINE) CONTRACEPTIVE DEVICE: Chronic | ICD-10-CM

## 2024-11-07 LAB
ALBUMIN SERPL ELPH-MCNC: 3.8 G/DL — SIGNIFICANT CHANGE UP (ref 3.3–5)
ALP SERPL-CCNC: 63 U/L — SIGNIFICANT CHANGE UP (ref 40–120)
ALT FLD-CCNC: 46 U/L — SIGNIFICANT CHANGE UP (ref 12–78)
ANION GAP SERPL CALC-SCNC: 5 MMOL/L — SIGNIFICANT CHANGE UP (ref 5–17)
AST SERPL-CCNC: 43 U/L — HIGH (ref 15–37)
BASOPHILS # BLD AUTO: 0.03 K/UL — SIGNIFICANT CHANGE UP (ref 0–0.2)
BASOPHILS NFR BLD AUTO: 0.3 % — SIGNIFICANT CHANGE UP (ref 0–2)
BILIRUB SERPL-MCNC: 0.5 MG/DL — SIGNIFICANT CHANGE UP (ref 0.2–1.2)
BUN SERPL-MCNC: 25 MG/DL — HIGH (ref 7–23)
CALCIUM SERPL-MCNC: 9.3 MG/DL — SIGNIFICANT CHANGE UP (ref 8.5–10.1)
CHLORIDE SERPL-SCNC: 107 MMOL/L — SIGNIFICANT CHANGE UP (ref 96–108)
CO2 SERPL-SCNC: 25 MMOL/L — SIGNIFICANT CHANGE UP (ref 22–31)
CREAT SERPL-MCNC: 0.61 MG/DL — SIGNIFICANT CHANGE UP (ref 0.5–1.3)
EGFR: 125 ML/MIN/1.73M2 — SIGNIFICANT CHANGE UP
EOSINOPHIL # BLD AUTO: 0.01 K/UL — SIGNIFICANT CHANGE UP (ref 0–0.5)
EOSINOPHIL NFR BLD AUTO: 0.1 % — SIGNIFICANT CHANGE UP (ref 0–6)
GLUCOSE SERPL-MCNC: 118 MG/DL — HIGH (ref 70–99)
HCG SERPL-ACNC: <1 MIU/ML — SIGNIFICANT CHANGE UP
HCT VFR BLD CALC: 39.1 % — SIGNIFICANT CHANGE UP (ref 34.5–45)
HGB BLD-MCNC: 13.1 G/DL — SIGNIFICANT CHANGE UP (ref 11.5–15.5)
IMM GRANULOCYTES NFR BLD AUTO: 0.2 % — SIGNIFICANT CHANGE UP (ref 0–0.9)
LIDOCAIN IGE QN: 23 U/L — SIGNIFICANT CHANGE UP (ref 13–75)
LYMPHOCYTES # BLD AUTO: 1.96 K/UL — SIGNIFICANT CHANGE UP (ref 1–3.3)
LYMPHOCYTES # BLD AUTO: 20.9 % — SIGNIFICANT CHANGE UP (ref 13–44)
MCHC RBC-ENTMCNC: 27.5 PG — SIGNIFICANT CHANGE UP (ref 27–34)
MCHC RBC-ENTMCNC: 33.5 G/DL — SIGNIFICANT CHANGE UP (ref 32–36)
MCV RBC AUTO: 82 FL — SIGNIFICANT CHANGE UP (ref 80–100)
MONOCYTES # BLD AUTO: 0.79 K/UL — SIGNIFICANT CHANGE UP (ref 0–0.9)
MONOCYTES NFR BLD AUTO: 8.4 % — SIGNIFICANT CHANGE UP (ref 2–14)
NEUTROPHILS # BLD AUTO: 6.59 K/UL — SIGNIFICANT CHANGE UP (ref 1.8–7.4)
NEUTROPHILS NFR BLD AUTO: 70.1 % — SIGNIFICANT CHANGE UP (ref 43–77)
PLATELET # BLD AUTO: 429 K/UL — HIGH (ref 150–400)
POTASSIUM SERPL-MCNC: 3.6 MMOL/L — SIGNIFICANT CHANGE UP (ref 3.5–5.3)
POTASSIUM SERPL-SCNC: 3.6 MMOL/L — SIGNIFICANT CHANGE UP (ref 3.5–5.3)
PROT SERPL-MCNC: 7.4 GM/DL — SIGNIFICANT CHANGE UP (ref 6–8.3)
RBC # BLD: 4.77 M/UL — SIGNIFICANT CHANGE UP (ref 3.8–5.2)
RBC # FLD: 13.3 % — SIGNIFICANT CHANGE UP (ref 10.3–14.5)
SODIUM SERPL-SCNC: 137 MMOL/L — SIGNIFICANT CHANGE UP (ref 135–145)
WBC # BLD: 9.4 K/UL — SIGNIFICANT CHANGE UP (ref 3.8–10.5)
WBC # FLD AUTO: 9.4 K/UL — SIGNIFICANT CHANGE UP (ref 3.8–10.5)

## 2024-11-07 PROCEDURE — 96374 THER/PROPH/DIAG INJ IV PUSH: CPT

## 2024-11-07 PROCEDURE — 83690 ASSAY OF LIPASE: CPT

## 2024-11-07 PROCEDURE — 84702 CHORIONIC GONADOTROPIN TEST: CPT

## 2024-11-07 PROCEDURE — 99284 EMERGENCY DEPT VISIT MOD MDM: CPT | Mod: 25

## 2024-11-07 PROCEDURE — 85025 COMPLETE CBC W/AUTO DIFF WBC: CPT

## 2024-11-07 PROCEDURE — 99284 EMERGENCY DEPT VISIT MOD MDM: CPT

## 2024-11-07 PROCEDURE — 96375 TX/PRO/DX INJ NEW DRUG ADDON: CPT

## 2024-11-07 PROCEDURE — 36415 COLL VENOUS BLD VENIPUNCTURE: CPT

## 2024-11-07 PROCEDURE — 80053 COMPREHEN METABOLIC PANEL: CPT

## 2024-11-07 RX ORDER — OXYCODONE HYDROCHLORIDE 30 MG/1
1 TABLET ORAL
Qty: 20 | Refills: 0
Start: 2024-11-07 | End: 2024-11-11

## 2024-11-07 RX ORDER — ONDANSETRON HYDROCHLORIDE 2 MG/ML
4 INJECTION, SOLUTION INTRAMUSCULAR; INTRAVENOUS ONCE
Refills: 0 | Status: COMPLETED | OUTPATIENT
Start: 2024-11-07 | End: 2024-11-07

## 2024-11-07 RX ORDER — MORPHINE SULFATE 30 MG/1
4 TABLET, EXTENDED RELEASE ORAL ONCE
Refills: 0 | Status: DISCONTINUED | OUTPATIENT
Start: 2024-11-07 | End: 2024-11-07

## 2024-11-07 RX ORDER — SODIUM CHLORIDE 9 MG/ML
1000 INJECTION, SOLUTION INTRAMUSCULAR; INTRAVENOUS; SUBCUTANEOUS ONCE
Refills: 0 | Status: COMPLETED | OUTPATIENT
Start: 2024-11-07 | End: 2024-11-07

## 2024-11-07 RX ADMIN — MORPHINE SULFATE 4 MILLIGRAM(S): 30 TABLET, EXTENDED RELEASE ORAL at 03:25

## 2024-11-07 RX ADMIN — SODIUM CHLORIDE 1000 MILLILITER(S): 9 INJECTION, SOLUTION INTRAMUSCULAR; INTRAVENOUS; SUBCUTANEOUS at 04:46

## 2024-11-07 RX ADMIN — ONDANSETRON HYDROCHLORIDE 4 MILLIGRAM(S): 2 INJECTION, SOLUTION INTRAMUSCULAR; INTRAVENOUS at 03:21

## 2024-11-07 NOTE — ED ADULT NURSE NOTE - OBJECTIVE STATEMENT
pt presenting to the ed C/O generalized body pains due to recent dc of metastatic CA, refusing chemo. pt denies N/V/D, denies fevers, chills or sick contacts. pt states advil not relieving pain at home.

## 2024-11-07 NOTE — ED ADULT NURSE NOTE - BREATHING, MLM
Update History & Physical    H&P reviewed. No changes.     Electronically signed by Bruce Aponte MD on 10/12/2022 at 6:50 AM Spontaneous, unlabored and symmetrical

## 2024-11-07 NOTE — ED PROVIDER NOTE - CARE PROVIDER_API CALL
Gena Lino  Hematology  [address], [place] [address]  Phone: [phone]  Fax: [phone]  Follow Up Time: 1-3 Days

## 2024-11-07 NOTE — ED ADULT TRIAGE NOTE - CHIEF COMPLAINT QUOTE
patient complains of worsening pain to abdomen, back, and neck. has history of metastatic clear cell sarcoma, has known tumors in areas that are causing her pain. reports she is not currently being treated for cancer.

## 2024-11-07 NOTE — ED PROVIDER NOTE - PATIENT PORTAL LINK FT
You can access the FollowMyHealth Patient Portal offered by [facility] by registering at the following website: [email or web address]. By joining [employer]’s FollowMyHealth portal, you will also be able to view your health information using other applications (apps) compatible with our system.

## 2024-11-07 NOTE — ED PROVIDER NOTE - OBJECTIVE STATEMENT
29-year-old female with known metastatic cancer presents with worsening allover body pain for the past couple of days.  Patient states she has been taking Advil at home without much relief.  Denies any fever nausea vomiting diarrhea or rash. Eating and drinking well.  Having normal bowel movements.  No dysuria.

## 2024-11-07 NOTE — ED PROVIDER NOTE - CLINICAL SUMMARY MEDICAL DECISION MAKING FREE TEXT BOX
29-year-old female with known metastatic cancer presents with allover body pain.  Despite triage note chief complaint labeled as abdominal pain,   Patient really reports the pain is everywhere where she has known masses.  Appears otherwise well.  She is eating and drinking well and having normal bowel movements.  No vomiting or diarrhea.  Will obtain labs and treat pain and reassess.  Patient states he is feeling better after pain medication.  Will  send pain medication to pharmacy and have patient follow-up with heme-onc

## 2024-11-07 NOTE — ED PROVIDER NOTE - NSFOLLOWUPINSTRUCTIONS_ED_ALL_ED_FT
Take tylenol as needed for pain, 650Mg every 6-8 hours.  You can also take ibuprofen as needed for pain, 600mg every 6-8 hours, take with food.   please take oxycodone only as needed for severe pain.  Caution this medication can make you drowsy.   please follow-up with oncology.  Referral provided

## 2024-11-07 NOTE — ED PROVIDER NOTE - PHYSICAL EXAMINATION
Constitutional: uncomfortable appearing AAOx3  Eyes: PERRLA EOMI  Head: Normocephalic atraumatic  Mouth: MMM  Cardiac: regular rate anf rhythm  Resp: Lungs CTAB  GI: Abd s/nt/nd  Neuro: CN2-12 intact  Skin: No visible rashes

## 2024-11-07 NOTE — ED ADULT TRIAGE NOTE - HOW PATIENT ADDRESSED, PROFILE
Dulce Hall is a 78 year old female here for  Chief Complaint   Patient presents with    Cancer     Urothelial carcinoma of kidney, right     Denies latex allergy or sensitivity.    Medication verified, no changes.  PCP and Pharmacy verified.    Social History     Tobacco Use   Smoking Status Never   Smokeless Tobacco Never     Advance Directives Filed: Yes    ECOG:   ECOG [07/31/24 0855]   ECOG Performance Status 1       Vitals:    Visit Vitals  /76 (BP Location: LLE - Left lower extremity, Patient Position: Standing, Cuff Size: Regular)   Pulse 81   Temp 97.6 °F (36.4 °C)   Resp 20   Wt 92.4 kg (203 lb 12.8 oz)   LMP  (LMP Unknown)   SpO2 95%   BMI 37.28 kg/m²       These vital signs are:  Within defined parameters (Per Reference \"Defined Limits Hospital Outpatient Department (HOD)\")    Height: No.  Ht Readings from Last 1 Encounters:   06/26/24 5' 2\" (1.575 m)     Weight:Yes, shoes on.  Wt Readings from Last 3 Encounters:   07/31/24 92.4 kg (203 lb 12.8 oz)   07/18/24 93.4 kg (205 lb 12.8 oz)   07/10/24 92.7 kg (204 lb 6.4 oz)       BMI: Body mass index is 37.28 kg/m².    REVIEW OF SYSTEMS  GENERAL:  Patient denies headache, fevers, chills, night sweats, excessive fatigue, change in appetite, weight loss, dizziness  ALLERGIC/IMMUNOLOGIC: Verified allergies: Yes  EYES:  Patient denies significant visual difficulties, double vision, blurred vision  ENT/MOUTH: Patient denies problems with hearing, sore throat, sinus drainage, mouth sores  ENDOCRINE:  Patient denies diabetes, thyroid disease, hormone replacement, hot flashes  HEMATOLOGIC/LYMPHATIC: Patient denies easy bruising, bleeding, tender lymph nodes, swollen lymph nodes  BREASTS: Patient denies abnormal masses of breast, nipple discharge, pain  RESPIRATORY:  Patient denies lung pain with breathing, coughing up blood, shortness of breath, but complains of: cough  CARDIOVASCULAR:  Patient denies anginal chest pain, palpitations, shortness of breath  when lying flat, peripheral edema  GASTROINTESTINAL: Patient denies abdominal pain , vomiting, diarrhea, GI bleeding, constipation, change in bowel habits, heartburn, sensation of feeling full, difficulty swallowing, but complains of: nausea  : Patient denies abnormal genital masses, blood in the urine, frequency, urgency, burning with urination, hesitancy, incontinence, vaginal bleeding, discharge  MUSCULOSKELETAL:  Patient denies joint pain, bone pain, joint swelling, redness, decreased range of motion  SKIN:  Patient denies chronic rashes, inflammation, ulcerations, skin changes, itching  NEUROLOGIC:  Patient denies loss of balance, areas of focal weakness, abnormal gait, sensory problems, numbness, tingling  PSYCHIATRIC: Patient denies insomnia, depression, anxiety    This patient reported abnormal symptoms that needed immediate verbal communication: No     Jessie

## 2024-11-23 ENCOUNTER — EMERGENCY (EMERGENCY)
Facility: HOSPITAL | Age: 29
LOS: 1 days | Discharge: HOME | End: 2024-11-23
Attending: STUDENT IN AN ORGANIZED HEALTH CARE EDUCATION/TRAINING PROGRAM
Payer: MEDICAID

## 2024-11-23 VITALS
HEART RATE: 82 BPM | SYSTOLIC BLOOD PRESSURE: 130 MMHG | RESPIRATION RATE: 16 BRPM | OXYGEN SATURATION: 98 % | TEMPERATURE: 98 F | DIASTOLIC BLOOD PRESSURE: 76 MMHG

## 2024-11-23 VITALS
TEMPERATURE: 99 F | OXYGEN SATURATION: 98 % | WEIGHT: 190.04 LBS | HEIGHT: 64 IN | RESPIRATION RATE: 20 BRPM | HEART RATE: 108 BPM | SYSTOLIC BLOOD PRESSURE: 141 MMHG | DIASTOLIC BLOOD PRESSURE: 84 MMHG

## 2024-11-23 DIAGNOSIS — Z98.890 OTHER SPECIFIED POSTPROCEDURAL STATES: Chronic | ICD-10-CM

## 2024-11-23 DIAGNOSIS — Z97.5 PRESENCE OF (INTRAUTERINE) CONTRACEPTIVE DEVICE: Chronic | ICD-10-CM

## 2024-11-23 LAB
ALBUMIN SERPL ELPH-MCNC: 4.1 G/DL — SIGNIFICANT CHANGE UP (ref 3.3–5)
ALP SERPL-CCNC: 85 U/L — SIGNIFICANT CHANGE UP (ref 40–120)
ALT FLD-CCNC: 32 U/L — SIGNIFICANT CHANGE UP (ref 10–45)
ANION GAP SERPL CALC-SCNC: 14 MMOL/L — SIGNIFICANT CHANGE UP (ref 5–17)
APPEARANCE UR: CLEAR — SIGNIFICANT CHANGE UP
AST SERPL-CCNC: 54 U/L — HIGH (ref 10–40)
BACTERIA # UR AUTO: NEGATIVE /HPF — SIGNIFICANT CHANGE UP
BASOPHILS # BLD AUTO: 0.03 K/UL — SIGNIFICANT CHANGE UP (ref 0–0.2)
BASOPHILS NFR BLD AUTO: 0.4 % — SIGNIFICANT CHANGE UP (ref 0–2)
BILIRUB SERPL-MCNC: 0.4 MG/DL — SIGNIFICANT CHANGE UP (ref 0.2–1.2)
BILIRUB UR-MCNC: NEGATIVE — SIGNIFICANT CHANGE UP
BUN SERPL-MCNC: 11 MG/DL — SIGNIFICANT CHANGE UP (ref 7–23)
CALCIUM SERPL-MCNC: 9.8 MG/DL — SIGNIFICANT CHANGE UP (ref 8.4–10.5)
CAST: 1 /LPF — SIGNIFICANT CHANGE UP (ref 0–4)
CHLORIDE SERPL-SCNC: 103 MMOL/L — SIGNIFICANT CHANGE UP (ref 96–108)
CO2 SERPL-SCNC: 25 MMOL/L — SIGNIFICANT CHANGE UP (ref 22–31)
COLOR SPEC: YELLOW — SIGNIFICANT CHANGE UP
CREAT SERPL-MCNC: 0.61 MG/DL — SIGNIFICANT CHANGE UP (ref 0.5–1.3)
DIFF PNL FLD: NEGATIVE — SIGNIFICANT CHANGE UP
EGFR: 125 ML/MIN/1.73M2 — SIGNIFICANT CHANGE UP
EOSINOPHIL # BLD AUTO: 0.04 K/UL — SIGNIFICANT CHANGE UP (ref 0–0.5)
EOSINOPHIL NFR BLD AUTO: 0.6 % — SIGNIFICANT CHANGE UP (ref 0–6)
GAS PNL BLDV: SIGNIFICANT CHANGE UP
GLUCOSE SERPL-MCNC: 101 MG/DL — HIGH (ref 70–99)
GLUCOSE UR QL: NEGATIVE MG/DL — SIGNIFICANT CHANGE UP
HCG SERPL-ACNC: <2 MIU/ML — SIGNIFICANT CHANGE UP
HCT VFR BLD CALC: 40.4 % — SIGNIFICANT CHANGE UP (ref 34.5–45)
HGB BLD-MCNC: 12.9 G/DL — SIGNIFICANT CHANGE UP (ref 11.5–15.5)
IMM GRANULOCYTES NFR BLD AUTO: 0.6 % — SIGNIFICANT CHANGE UP (ref 0–0.9)
KETONES UR-MCNC: 40 MG/DL
LEUKOCYTE ESTERASE UR-ACNC: NEGATIVE — SIGNIFICANT CHANGE UP
LYMPHOCYTES # BLD AUTO: 1.46 K/UL — SIGNIFICANT CHANGE UP (ref 1–3.3)
LYMPHOCYTES # BLD AUTO: 21.1 % — SIGNIFICANT CHANGE UP (ref 13–44)
MCHC RBC-ENTMCNC: 26.5 PG — LOW (ref 27–34)
MCHC RBC-ENTMCNC: 31.9 G/DL — LOW (ref 32–36)
MCV RBC AUTO: 83 FL — SIGNIFICANT CHANGE UP (ref 80–100)
MONOCYTES # BLD AUTO: 0.58 K/UL — SIGNIFICANT CHANGE UP (ref 0–0.9)
MONOCYTES NFR BLD AUTO: 8.4 % — SIGNIFICANT CHANGE UP (ref 2–14)
NEUTROPHILS # BLD AUTO: 4.78 K/UL — SIGNIFICANT CHANGE UP (ref 1.8–7.4)
NEUTROPHILS NFR BLD AUTO: 68.9 % — SIGNIFICANT CHANGE UP (ref 43–77)
NITRITE UR-MCNC: NEGATIVE — SIGNIFICANT CHANGE UP
NRBC # BLD: 0 /100 WBCS — SIGNIFICANT CHANGE UP (ref 0–0)
PH UR: 6.5 — SIGNIFICANT CHANGE UP (ref 5–8)
PLATELET # BLD AUTO: 487 K/UL — HIGH (ref 150–400)
POTASSIUM SERPL-MCNC: 4.9 MMOL/L — SIGNIFICANT CHANGE UP (ref 3.5–5.3)
POTASSIUM SERPL-SCNC: 4.9 MMOL/L — SIGNIFICANT CHANGE UP (ref 3.5–5.3)
PROT SERPL-MCNC: 7.1 G/DL — SIGNIFICANT CHANGE UP (ref 6–8.3)
PROT UR-MCNC: NEGATIVE MG/DL — SIGNIFICANT CHANGE UP
RBC # BLD: 4.87 M/UL — SIGNIFICANT CHANGE UP (ref 3.8–5.2)
RBC # FLD: 13.7 % — SIGNIFICANT CHANGE UP (ref 10.3–14.5)
RBC CASTS # UR COMP ASSIST: 1 /HPF — SIGNIFICANT CHANGE UP (ref 0–4)
SODIUM SERPL-SCNC: 142 MMOL/L — SIGNIFICANT CHANGE UP (ref 135–145)
SP GR SPEC: >1.03 — HIGH (ref 1–1.03)
SQUAMOUS # UR AUTO: 1 /HPF — SIGNIFICANT CHANGE UP (ref 0–5)
UROBILINOGEN FLD QL: 0.2 MG/DL — SIGNIFICANT CHANGE UP (ref 0.2–1)
WBC # BLD: 6.93 K/UL — SIGNIFICANT CHANGE UP (ref 3.8–10.5)
WBC # FLD AUTO: 6.93 K/UL — SIGNIFICANT CHANGE UP (ref 3.8–10.5)
WBC UR QL: 1 /HPF — SIGNIFICANT CHANGE UP (ref 0–5)

## 2024-11-23 PROCEDURE — 74177 CT ABD & PELVIS W/CONTRAST: CPT | Mod: 26,MC

## 2024-11-23 PROCEDURE — 96375 TX/PRO/DX INJ NEW DRUG ADDON: CPT | Mod: XU

## 2024-11-23 PROCEDURE — 71260 CT THORAX DX C+: CPT | Mod: MC

## 2024-11-23 PROCEDURE — 84132 ASSAY OF SERUM POTASSIUM: CPT

## 2024-11-23 PROCEDURE — 84295 ASSAY OF SERUM SODIUM: CPT

## 2024-11-23 PROCEDURE — 81001 URINALYSIS AUTO W/SCOPE: CPT

## 2024-11-23 PROCEDURE — 82435 ASSAY OF BLOOD CHLORIDE: CPT

## 2024-11-23 PROCEDURE — 74177 CT ABD & PELVIS W/CONTRAST: CPT | Mod: MC

## 2024-11-23 PROCEDURE — 85025 COMPLETE CBC W/AUTO DIFF WBC: CPT

## 2024-11-23 PROCEDURE — 83690 ASSAY OF LIPASE: CPT

## 2024-11-23 PROCEDURE — 99285 EMERGENCY DEPT VISIT HI MDM: CPT

## 2024-11-23 PROCEDURE — 84702 CHORIONIC GONADOTROPIN TEST: CPT

## 2024-11-23 PROCEDURE — 99284 EMERGENCY DEPT VISIT MOD MDM: CPT | Mod: 25

## 2024-11-23 PROCEDURE — 83605 ASSAY OF LACTIC ACID: CPT

## 2024-11-23 PROCEDURE — 85014 HEMATOCRIT: CPT

## 2024-11-23 PROCEDURE — 82947 ASSAY GLUCOSE BLOOD QUANT: CPT

## 2024-11-23 PROCEDURE — 71260 CT THORAX DX C+: CPT | Mod: 26,MC

## 2024-11-23 PROCEDURE — 80053 COMPREHEN METABOLIC PANEL: CPT

## 2024-11-23 PROCEDURE — 82803 BLOOD GASES ANY COMBINATION: CPT

## 2024-11-23 PROCEDURE — 96374 THER/PROPH/DIAG INJ IV PUSH: CPT | Mod: XU

## 2024-11-23 PROCEDURE — 82330 ASSAY OF CALCIUM: CPT

## 2024-11-23 PROCEDURE — 87086 URINE CULTURE/COLONY COUNT: CPT

## 2024-11-23 PROCEDURE — 85018 HEMOGLOBIN: CPT

## 2024-11-23 RX ORDER — ONDANSETRON HYDROCHLORIDE 2 MG/ML
4 INJECTION, SOLUTION INTRAMUSCULAR; INTRAVENOUS ONCE
Refills: 0 | Status: DISCONTINUED | OUTPATIENT
Start: 2024-11-23 | End: 2024-11-27

## 2024-11-23 RX ORDER — SODIUM CHLORIDE 9 MG/ML
1000 INJECTION, SOLUTION INTRAMUSCULAR; INTRAVENOUS; SUBCUTANEOUS ONCE
Refills: 0 | Status: COMPLETED | OUTPATIENT
Start: 2024-11-23 | End: 2024-11-23

## 2024-11-23 RX ORDER — OXYCODONE HYDROCHLORIDE 30 MG/1
1 TABLET ORAL
Qty: 7 | Refills: 0
Start: 2024-11-23

## 2024-11-23 RX ORDER — ONDANSETRON HYDROCHLORIDE 2 MG/ML
1 INJECTION, SOLUTION INTRAMUSCULAR; INTRAVENOUS
Qty: 1 | Refills: 0
Start: 2024-11-23

## 2024-11-23 RX ORDER — MORPHINE SULFATE 30 MG/1
4 TABLET, EXTENDED RELEASE ORAL ONCE
Refills: 0 | Status: DISCONTINUED | OUTPATIENT
Start: 2024-11-23 | End: 2024-11-23

## 2024-11-23 RX ORDER — ACETAMINOPHEN 500 MG
1000 TABLET ORAL ONCE
Refills: 0 | Status: COMPLETED | OUTPATIENT
Start: 2024-11-23 | End: 2024-11-23

## 2024-11-23 RX ADMIN — Medication 400 MILLIGRAM(S): at 17:03

## 2024-11-23 RX ADMIN — SODIUM CHLORIDE 1000 MILLILITER(S): 9 INJECTION, SOLUTION INTRAMUSCULAR; INTRAVENOUS; SUBCUTANEOUS at 17:01

## 2024-11-23 RX ADMIN — MORPHINE SULFATE 4 MILLIGRAM(S): 30 TABLET, EXTENDED RELEASE ORAL at 17:01

## 2024-11-23 NOTE — ED PROVIDER NOTE - PROGRESS NOTE DETAILS
(Raegan Lindsey MD-PGY1): Patient remains stable. Signed out pending CTAP read and ultimate dispo.    She has metastatic clear cell carcinoma--please see the discharge note in HIE regarding course and barriers to treatment from May. If discharging, would recommend she have good outpt follow up or discussion on what barriers still exist. It seems she had insurance issue prior and when talking to patient, she expressed she did not want the treatment that onc was offering, so she never followed up. Reviewed chart and saw she was here more recently for abdominal pain and given oxy, but hadn't followed up either, so good follow  up or understanding of why not, may be helpful for her overall care and possible pain vs palliative in outpt setting if not wanting treatment. DO Zohreh (PGY1): Received sign out, pending CT results.   CT results showed new metastases.   Results discussed with patient and their family. Instructed the importance of following up with their oncologist for treatment and management. Referral given to oncology. Given a prescription for oxycodone for severe pain as well as Zofran for nausea. Recommended taking Tylenol for pain. Strict return precautions given. The patient expressed understanding and feels comfortable being discharged home.

## 2024-11-23 NOTE — ED PROVIDER NOTE - NSFOLLOWUPINSTRUCTIONS_ED_ALL_ED_FT
You were seen in the ED for abdominal pain where you have a history of cancer.    1) Follow up with your oncologist this week.   2) Return to the ED immediately for new or worsening symptoms: fever, vomiting, chest pain, dark/bloody stools, inability to pass gas/stool/urine, worsening pain.   3) Please continue to take any home medications as prescribed  4) Your test results from your ED visit were discussed with you prior to discharge  5) You were provided with a copy of your test results  6) Please take Tylenol 975 mg every 6 hours as needed for pain. Please do not exceed more than 4,000 mg of Tylenol in a day  7) You were given a prescription for Oxycodone 5 mg. Please take 1 tablet every 8 hours as needed for severe pain. Do not take more than 3 tablets each day.   8) You were given a prescription for Zofran. Please take 1 tablet every 8 hours as needed for nausea.     Abdominal Pain  Many things can cause abdominal pain. Many times, abdominal pain is not caused by a disease and will improve without treatment. Your health care provider will do a physical exam to determine if there is a dangerous cause of your pain; blood tests and imaging may help determine the cause of your pain. However, in many cases, no cause may be found and you may need further testing as an outpatient. Monitor your abdominal pain for any changes.     SEEK IMMEDIATE MEDICAL CARE IF YOU HAVE ANY OF THE FOLLOWING SYMPTOMS: worsening abdominal pain, uncontrollable vomiting, profuse diarrhea, inability to have bowel movements or pass gas, black or bloody stools, fever accompanying chest pain or back pain, or fainting. These symptoms may represent a serious problem that is an emergency. Do not wait to see if the symptoms will go away. Get medical help right away. Call 911 and do not drive yourself to the hospital. Lo atendieron en el servicio de urgencias por dolor abdominal y tiene antecedentes de cáncer.    1) Haz un seguimiento con tu oncólogo esta semana.   2) Regrese al servicio de urgencias de inmediato si los síntomas nuevos o empeoran: fiebre, vómitos, dolor en el pecho, heces oscuras/con trung, incapacidad para expulsar gases/heces/orina, empeoramiento del dolor.   3) Continúe tomando los medicamentos caseros según lo recetado.  4) Los resultados de las pruebas de weir visita al servicio de urgencias se analizaron con usted antes del nuvia.  5) Se le proporcionó pradip copia de los resultados de weir prueba.  6) [place] Tylenol 975 mg cada 6 horas según sea necesario para el dolor. No exceda más de 4000 mg de Tylenol al día.  7) Le recetaron oxicodona 5 mg. [place] 1 tableta cada 8 horas según sea necesario para el dolor intenso. No tome más de 3 comprimidos al día.   8) Le recetaron Zofran. [place] 1 tableta cada 8 horas según sea necesario para las náuseas.    dolor abdominal  Muchas cosas pueden causar dolor abdominal. Muchas veces, el dolor abdominal no es causado por pradip enfermedad y mejorará sin tratamiento. Weir proveedor de atención médica le realizará un examen físico para determinar si existe pradip causa peligrosa de weir dolor; Los análisis de trung y las imágenes pueden ayudar a determinar la causa de weir dolor. Sin embargo, en muchos casos, es posible que no se encuentre ninguna causa y es posible que necesite más pruebas aylin paciente ambulatorio. Controle weir dolor abdominal para detectar cualquier cambio.     BUSQUE ATENCIÓN MÉDICA INMEDIATA SI TIENE ALGUNO DE LOS SIGUIENTES SÍNTOMAS: empeoramiento del dolor abdominal, vómitos incontrolables, diarrea profusa, incapacidad para defecar o expulsar gases, heces negras o con trung, fiebre que acompaña al dolor de pecho o de espalda, o desmayos. Estos síntomas pueden representar un problema grave que es pradip emergencia. No espere a liz si los síntomas desaparecen. Obtenga ayuda médica de inmediato. Llame al 911 y no conduzca hasta el hospital.

## 2024-11-23 NOTE — ED PROVIDER NOTE - NSFOLLOWUPCLINICS_GEN_ALL_ED_FT
[facility]  Hematology/Oncology  [address] [id number]  Phone: [phone]  Fax:

## 2024-11-23 NOTE — ED ADULT NURSE REASSESSMENT NOTE - NS ED NURSE REASSESS COMMENT FT1
Received patient from KAMILAH Balderas, patient at baseline mental status, able to make needs known, patient agreeable to plan of care, pending radiology, comfort and safety provided.

## 2024-11-23 NOTE — ED PROVIDER NOTE - IV ALTEPLASE INCLUSION HIDDEN
show
Quality 402: Tobacco Use And Help With Quitting Among Adolescents: Patient screened for tobacco and never smoked
Detail Level: Detailed

## 2024-11-23 NOTE — ED PROVIDER NOTE - CLINICAL SUMMARY MEDICAL DECISION MAKING FREE TEXT BOX
Attending Rosa Rodriguez MD: 30 yo F with PMHx of metastatic clear cell carcinoma presents with abdominal pain. She reports generalized abdominal pain that worsened over the past couple of days. She reports decreased bowel movements over the past few days, last one yesterday. She denies hematochezia, vaginal bleeding or discharge. She states she has chest discomfort as well. She denies nausea, vomiting. Her  reports intermittent fever but no reordered temperature. She was initially following with an oncologist but has not received care for her cancer recently.     PE: well appearing, nontoxic, no respiratory distress. Generalized abdominal discomfort, R flank pain. Enlarged mass at left superior anterior chest wall with tenderness to palpation.  Neuro nonfocal.  Skin intact. Psych normal mood.    MDM: Differential diagnosis includes but is not limited to gastritis, cholelithiasis, cholecystitis, small bowel obstruction, diverticulitis, colitis, pancreatitis, progression of disease   Will assess with labs, CTAP and ordered for morphine for pain control Attending Rosa Rodriguez MD: 28 yo F with PMHx of metastatic clear cell carcinoma presents with abdominal pain. She reports generalized abdominal pain that worsened over the past couple of days. She reports decreased bowel movements over the past few days, last one yesterday. She denies hematochezia, vaginal bleeding or discharge. She states she has chest discomfort as well. She denies nausea, vomiting. Her family report intermittent fever but no reordered temperature. She was initially following with an oncologist but has not received care for her cancer recently.     PE: well appearing, nontoxic, no respiratory distress. Generalized abdominal discomfort, R flank pain. Enlarged mass at left superior anterior chest wall with tenderness to palpation.  Neuro nonfocal.  Skin intact. Psych normal mood.    MDM: Differential diagnosis includes but is not limited to gastritis, cholelithiasis, cholecystitis, small bowel obstruction, diverticulitis, colitis, pancreatitis, progression of disease   Will assess with labs, CTAP and ordered for morphine for pain control

## 2024-11-23 NOTE — ED ADULT NURSE NOTE - OBJECTIVE STATEMENT
29 year old female complaining of abdominal pain for the past month. PMH lymphatic cancer.A&Ox4. Bangladeshi speaking. Patient ambulatory independently with steady gait. Patient states she has had constant abdominal pain for the past month and she was given oxycodone at a previous hospital with no relief. Patient states she has been constipated and her last bowel movement yesterday was small. Patient endorses nausea and vomiting intermittently over the last four days. Patient states her last menstrual period was 11/7/24. Patient denies chest pain, SOB.  Name Artie, ID #[id number].

## 2024-11-23 NOTE — ED PROVIDER NOTE - PATIENT PORTAL LINK FT
You can access the FollowMyHealth Patient Portal offered by [facility] by registering at the following website: [email or web address]. By joining [facility]’s FollowMyHealth portal, you will also be able to view your health information using other applications (apps) compatible with our system.

## 2024-11-24 LAB
CULTURE RESULTS: SIGNIFICANT CHANGE UP
SPECIMEN SOURCE: SIGNIFICANT CHANGE UP

## 2025-04-22 NOTE — PACU DISCHARGE NOTE - NSCLINEINSERTRD_GEN_ALL_CORE
Follow Up Social Work Note - Ambulatory  4/22/2025          Two Identifiers Verified: Yes (PHI form verified; on file)    Identified Needs:  Transportation resources  Home health      Briefly spoke with patient spouse, Gloria, about patient goals. Spouse reported patient received WC, wedge and someone came out to measure home for ramp installation. Will return in 2 weeks for update on ramp. Provided spouse again with resources for transportation: [employer], [place] [employer]. Spoke to [employer] to mail application to patient/spouse. [facility]. Also, patient receives personal care from [email or web address]. PCP ordered home health.  Spouse unable to recall if home health is engaged or identify a company. Will follow up with PCP.      Social Work Plan:  SW will follow up with PATRICK Lebron  about home health and transportation.       Nika Vance LMSW  Care Management          
No

## (undated) DEVICE — ELCTR BOVIE PENCIL SMOKE EVACUATION

## (undated) DEVICE — SUT VICRYL 2-0 27" CP-1 UNDYED

## (undated) DEVICE — TAPE SILK 3"

## (undated) DEVICE — SUT VICRYL 0 27" OS-6 UNDYED

## (undated) DEVICE — ELCTR BOVIE TIP NEEDLE INSULATED 2.8" EDGE

## (undated) DEVICE — BRACE KNEE IMMOBILIZER SPLINT SUPER LARGE 24"

## (undated) DEVICE — DRAPE U POLY BLUE 60"X60"

## (undated) DEVICE — TOURNIQUET ESMARK 6"

## (undated) DEVICE — DRAPE 3/4 SHEET 52X76"

## (undated) DEVICE — PROTECTOR HEEL / ELBOW FLUFFY

## (undated) DEVICE — DRSG COBAN 4"

## (undated) DEVICE — TRAP SPECIMEN SPUTUM 40CC

## (undated) DEVICE — POSITIONER STRAP ARMBOARD VELCRO TS-30

## (undated) DEVICE — PACK LIJ BASIC ORTHO

## (undated) DEVICE — PREP CHLORAPREP HI-LITE ORANGE 26ML

## (undated) DEVICE — SUT ETHILON 2-0 18" FS

## (undated) DEVICE — DRSG STOCKINETTE IMPERVIOUS XL

## (undated) DEVICE — DRAPE COVER SNAP 36X30"

## (undated) DEVICE — SUT FIBERWIRE #2 38" STRAND 1 BLUE T-5 TAPER

## (undated) DEVICE — CONNECTOR 5 IN1 SUCTION TUBING

## (undated) DEVICE — DRSG XEROFORM 5 X 9"

## (undated) DEVICE — DRSG TELFA 3 X 8

## (undated) DEVICE — DRSG COBAN COMPRESSION SYSTEM 2 LAYER

## (undated) DEVICE — GLV 8 PROTEXIS (CREAM) MICRO

## (undated) DEVICE — VENODYNE/SCD SLEEVE CALF MEDIUM

## (undated) DEVICE — WARMING BLANKET LOWER ADULT

## (undated) DEVICE — DRAPE IOBAN 23" X 23"